# Patient Record
Sex: FEMALE | Race: WHITE | NOT HISPANIC OR LATINO | Employment: FULL TIME | URBAN - METROPOLITAN AREA
[De-identification: names, ages, dates, MRNs, and addresses within clinical notes are randomized per-mention and may not be internally consistent; named-entity substitution may affect disease eponyms.]

---

## 2018-07-16 ENCOUNTER — OFFICE VISIT CONVERTED (OUTPATIENT)
Dept: ORTHOPEDIC SURGERY | Facility: CLINIC | Age: 58
End: 2018-07-16
Attending: ORTHOPAEDIC SURGERY

## 2018-08-06 ENCOUNTER — CONVERSION ENCOUNTER (OUTPATIENT)
Dept: ORTHOPEDIC SURGERY | Facility: CLINIC | Age: 58
End: 2018-08-06

## 2018-08-06 ENCOUNTER — OFFICE VISIT CONVERTED (OUTPATIENT)
Dept: ORTHOPEDIC SURGERY | Facility: CLINIC | Age: 58
End: 2018-08-06
Attending: ORTHOPAEDIC SURGERY

## 2018-09-10 ENCOUNTER — CONVERSION ENCOUNTER (OUTPATIENT)
Dept: ORTHOPEDIC SURGERY | Facility: CLINIC | Age: 58
End: 2018-09-10

## 2018-09-10 ENCOUNTER — OFFICE VISIT CONVERTED (OUTPATIENT)
Dept: ORTHOPEDIC SURGERY | Facility: CLINIC | Age: 58
End: 2018-09-10
Attending: ORTHOPAEDIC SURGERY

## 2018-09-20 ENCOUNTER — OFFICE VISIT CONVERTED (OUTPATIENT)
Dept: ORTHOPEDIC SURGERY | Facility: CLINIC | Age: 58
End: 2018-09-20
Attending: ORTHOPAEDIC SURGERY

## 2018-10-18 ENCOUNTER — OFFICE VISIT CONVERTED (OUTPATIENT)
Dept: ORTHOPEDIC SURGERY | Facility: CLINIC | Age: 58
End: 2018-10-18
Attending: ORTHOPAEDIC SURGERY

## 2018-11-17 ENCOUNTER — CONVERSION ENCOUNTER (OUTPATIENT)
Dept: MAMMOGRAPHY | Facility: HOSPITAL | Age: 58
End: 2018-11-17

## 2018-11-29 ENCOUNTER — OFFICE VISIT CONVERTED (OUTPATIENT)
Dept: ORTHOPEDIC SURGERY | Facility: CLINIC | Age: 58
End: 2018-11-29
Attending: ORTHOPAEDIC SURGERY

## 2019-04-19 ENCOUNTER — OFFICE VISIT CONVERTED (OUTPATIENT)
Dept: ORTHOPEDIC SURGERY | Facility: CLINIC | Age: 59
End: 2019-04-19
Attending: ORTHOPAEDIC SURGERY

## 2019-04-19 ENCOUNTER — CONVERSION ENCOUNTER (OUTPATIENT)
Dept: ORTHOPEDIC SURGERY | Facility: CLINIC | Age: 59
End: 2019-04-19

## 2019-06-12 ENCOUNTER — HOSPITAL ENCOUNTER (OUTPATIENT)
Dept: MAMMOGRAPHY | Facility: HOSPITAL | Age: 59
Discharge: HOME | End: 2019-06-12
Attending: PHYSICIAN ASSISTANT

## 2019-12-19 ENCOUNTER — HOSPITAL ENCOUNTER (OUTPATIENT)
Dept: LAB | Facility: HOSPITAL | Age: 59
Discharge: HOME | End: 2019-12-19
Attending: PHYSICIAN ASSISTANT

## 2019-12-19 LAB
25(OH)D3 SERPL-MCNC: 21.5 NG/ML (ref 30–100)
ALBUMIN SERPL-MCNC: 4.1 G/DL (ref 3.5–5)
ALBUMIN/GLOB SERPL: 1.3 {RATIO} (ref 1.4–2.6)
ALP SERPL-CCNC: 65 U/L (ref 53–141)
ALT SERPL-CCNC: 42 U/L (ref 10–40)
ANION GAP SERPL CALC-SCNC: 16 MMOL/L (ref 8–19)
AST SERPL-CCNC: 28 U/L (ref 15–50)
BASOPHILS # BLD AUTO: 0.06 10*3/UL (ref 0–0.2)
BASOPHILS NFR BLD AUTO: 0.9 % (ref 0–3)
BILIRUB SERPL-MCNC: 0.49 MG/DL (ref 0.2–1.3)
BUN SERPL-MCNC: 19 MG/DL (ref 5–25)
BUN/CREAT SERPL: 26 {RATIO} (ref 6–20)
CALCIUM SERPL-MCNC: 8.7 MG/DL (ref 8.7–10.4)
CHLORIDE SERPL-SCNC: 103 MMOL/L (ref 99–111)
CHOLEST SERPL-MCNC: 184 MG/DL (ref 107–200)
CHOLEST/HDLC SERPL: 4.2 {RATIO} (ref 3–6)
CONV ABS IMM GRAN: 0.02 10*3/UL (ref 0–0.2)
CONV CO2: 27 MMOL/L (ref 22–32)
CONV IMMATURE GRAN: 0.3 % (ref 0–1.8)
CONV TOTAL PROTEIN: 7.2 G/DL (ref 6.3–8.2)
CREAT UR-MCNC: 0.74 MG/DL (ref 0.5–0.9)
DEPRECATED RDW RBC AUTO: 41.7 FL (ref 36.4–46.3)
EOSINOPHIL # BLD AUTO: 0.24 10*3/UL (ref 0–0.7)
EOSINOPHIL # BLD AUTO: 3.6 % (ref 0–7)
ERYTHROCYTE [DISTWIDTH] IN BLOOD BY AUTOMATED COUNT: 12.8 % (ref 11.7–14.4)
GFR SERPLBLD BASED ON 1.73 SQ M-ARVRAT: >60 ML/MIN/{1.73_M2}
GLOBULIN UR ELPH-MCNC: 3.1 G/DL (ref 2–3.5)
GLUCOSE SERPL-MCNC: 95 MG/DL (ref 65–99)
HCT VFR BLD AUTO: 41 % (ref 37–47)
HDLC SERPL-MCNC: 44 MG/DL (ref 40–60)
HGB BLD-MCNC: 13.6 G/DL (ref 12–16)
LDLC SERPL CALC-MCNC: 110 MG/DL (ref 70–100)
LYMPHOCYTES # BLD AUTO: 2.22 10*3/UL (ref 1–5)
LYMPHOCYTES NFR BLD AUTO: 33.5 % (ref 20–45)
MCH RBC QN AUTO: 29.6 PG (ref 27–31)
MCHC RBC AUTO-ENTMCNC: 33.2 G/DL (ref 33–37)
MCV RBC AUTO: 89.1 FL (ref 81–99)
MONOCYTES # BLD AUTO: 0.56 10*3/UL (ref 0.2–1.2)
MONOCYTES NFR BLD AUTO: 8.5 % (ref 3–10)
NEUTROPHILS # BLD AUTO: 3.52 10*3/UL (ref 2–8)
NEUTROPHILS NFR BLD AUTO: 53.2 % (ref 30–85)
NRBC CBCN: 0 % (ref 0–0.7)
OSMOLALITY SERPL CALC.SUM OF ELEC: 296 MOSM/KG (ref 273–304)
PLATELET # BLD AUTO: 284 10*3/UL (ref 130–400)
PMV BLD AUTO: 9.2 FL (ref 9.4–12.3)
POTASSIUM SERPL-SCNC: 3.9 MMOL/L (ref 3.5–5.3)
RBC # BLD AUTO: 4.6 10*6/UL (ref 4.2–5.4)
SODIUM SERPL-SCNC: 142 MMOL/L (ref 135–147)
T4 FREE SERPL-MCNC: 1.4 NG/DL (ref 0.9–1.8)
TRIGL SERPL-MCNC: 148 MG/DL (ref 40–150)
TSH SERPL-ACNC: 2.31 M[IU]/L (ref 0.27–4.2)
VLDLC SERPL-MCNC: 30 MG/DL (ref 5–37)
WBC # BLD AUTO: 6.62 10*3/UL (ref 4.8–10.8)

## 2020-06-23 ENCOUNTER — HOSPITAL ENCOUNTER (OUTPATIENT)
Dept: LAB | Facility: HOSPITAL | Age: 60
Discharge: HOME | End: 2020-06-23
Attending: PHYSICIAN ASSISTANT

## 2020-06-23 LAB
25(OH)D3 SERPL-MCNC: 30.1 NG/ML (ref 30–100)
ALBUMIN SERPL-MCNC: 4.3 G/DL (ref 3.5–5)
ALBUMIN/GLOB SERPL: 1.4 {RATIO} (ref 1.4–2.6)
ALP SERPL-CCNC: 63 U/L (ref 53–141)
ALT SERPL-CCNC: 30 U/L (ref 10–40)
ANION GAP SERPL CALC-SCNC: 17 MMOL/L (ref 8–19)
AST SERPL-CCNC: 23 U/L (ref 15–50)
BILIRUB SERPL-MCNC: 0.57 MG/DL (ref 0.2–1.3)
BUN SERPL-MCNC: 17 MG/DL (ref 5–25)
BUN/CREAT SERPL: 24 {RATIO} (ref 6–20)
CALCIUM SERPL-MCNC: 8.8 MG/DL (ref 8.7–10.4)
CHLORIDE SERPL-SCNC: 103 MMOL/L (ref 99–111)
CHOLEST SERPL-MCNC: 187 MG/DL (ref 107–200)
CHOLEST/HDLC SERPL: 4.3 {RATIO} (ref 3–6)
CONV CO2: 25 MMOL/L (ref 22–32)
CONV TOTAL PROTEIN: 7.3 G/DL (ref 6.3–8.2)
CREAT UR-MCNC: 0.72 MG/DL (ref 0.5–0.9)
GFR SERPLBLD BASED ON 1.73 SQ M-ARVRAT: >60 ML/MIN/{1.73_M2}
GLOBULIN UR ELPH-MCNC: 3 G/DL (ref 2–3.5)
GLUCOSE SERPL-MCNC: 88 MG/DL (ref 65–99)
HDLC SERPL-MCNC: 43 MG/DL (ref 40–60)
LDLC SERPL CALC-MCNC: 115 MG/DL (ref 70–100)
OSMOLALITY SERPL CALC.SUM OF ELEC: 293 MOSM/KG (ref 273–304)
POTASSIUM SERPL-SCNC: 4.2 MMOL/L (ref 3.5–5.3)
SODIUM SERPL-SCNC: 141 MMOL/L (ref 135–147)
T4 FREE SERPL-MCNC: 1.4 NG/DL (ref 0.9–1.8)
TRIGL SERPL-MCNC: 146 MG/DL (ref 40–150)
TSH SERPL-ACNC: 2.07 M[IU]/L (ref 0.27–4.2)
VLDLC SERPL-MCNC: 29 MG/DL (ref 5–37)

## 2020-09-24 ENCOUNTER — OFFICE VISIT CONVERTED (OUTPATIENT)
Dept: ORTHOPEDIC SURGERY | Facility: CLINIC | Age: 60
End: 2020-09-24
Attending: ORTHOPAEDIC SURGERY

## 2020-10-13 ENCOUNTER — OFFICE VISIT CONVERTED (OUTPATIENT)
Dept: ORTHOPEDIC SURGERY | Facility: CLINIC | Age: 60
End: 2020-10-13
Attending: ORTHOPAEDIC SURGERY

## 2020-10-28 ENCOUNTER — HOSPITAL ENCOUNTER (OUTPATIENT)
Dept: PREADMISSION TESTING | Facility: HOSPITAL | Age: 60
Discharge: HOME | End: 2020-10-28
Attending: ORTHOPAEDIC SURGERY

## 2020-10-30 LAB — SARS-COV-2 RNA SPEC QL NAA+PROBE: NOT DETECTED

## 2020-11-02 ENCOUNTER — HOSPITAL ENCOUNTER (OUTPATIENT)
Dept: PERIOP | Facility: HOSPITAL | Age: 60
Setting detail: HOSPITAL OUTPATIENT SURGERY
Discharge: HOME | End: 2020-11-02
Attending: ORTHOPAEDIC SURGERY

## 2020-11-12 ENCOUNTER — HOSPITAL ENCOUNTER (OUTPATIENT)
Dept: CARDIOLOGY | Facility: HOSPITAL | Age: 60
Discharge: HOME | End: 2020-11-12
Attending: ORTHOPAEDIC SURGERY

## 2020-11-17 ENCOUNTER — OFFICE VISIT CONVERTED (OUTPATIENT)
Dept: ORTHOPEDIC SURGERY | Facility: CLINIC | Age: 60
End: 2020-11-17
Attending: ORTHOPAEDIC SURGERY

## 2020-12-17 ENCOUNTER — OFFICE VISIT CONVERTED (OUTPATIENT)
Dept: ORTHOPEDIC SURGERY | Facility: CLINIC | Age: 60
End: 2020-12-17
Attending: PHYSICIAN ASSISTANT

## 2021-03-22 ENCOUNTER — HOSPITAL ENCOUNTER (OUTPATIENT)
Dept: LAB | Facility: HOSPITAL | Age: 61
Discharge: HOME | End: 2021-03-22
Attending: PHYSICIAN ASSISTANT

## 2021-03-22 LAB
25(OH)D3 SERPL-MCNC: 26.8 NG/ML (ref 30–100)
ALBUMIN SERPL-MCNC: 4.1 G/DL (ref 3.5–5)
ALBUMIN/GLOB SERPL: 1.3 {RATIO} (ref 1.4–2.6)
ALP SERPL-CCNC: 67 U/L (ref 43–160)
ALT SERPL-CCNC: 109 U/L (ref 10–40)
ANION GAP SERPL CALC-SCNC: 17 MMOL/L (ref 8–19)
AST SERPL-CCNC: 59 U/L (ref 15–50)
BASOPHILS # BLD AUTO: 0.07 10*3/UL (ref 0–0.2)
BASOPHILS NFR BLD AUTO: 1.1 % (ref 0–3)
BILIRUB SERPL-MCNC: 0.5 MG/DL (ref 0.2–1.3)
BNP SERPL-MCNC: 106 PG/ML (ref 0–900)
BUN SERPL-MCNC: 27 MG/DL (ref 5–25)
BUN/CREAT SERPL: 33 {RATIO} (ref 6–20)
CALCIUM SERPL-MCNC: 9.2 MG/DL (ref 8.7–10.4)
CHLORIDE SERPL-SCNC: 102 MMOL/L (ref 99–111)
CONV ABS IMM GRAN: 0.02 10*3/UL (ref 0–0.2)
CONV CO2: 27 MMOL/L (ref 22–32)
CONV IMMATURE GRAN: 0.3 % (ref 0–1.8)
CONV TOTAL PROTEIN: 7.2 G/DL (ref 6.3–8.2)
CREAT UR-MCNC: 0.83 MG/DL (ref 0.5–0.9)
DEPRECATED RDW RBC AUTO: 46.2 FL (ref 36.4–46.3)
EOSINOPHIL # BLD AUTO: 0.36 10*3/UL (ref 0–0.7)
EOSINOPHIL # BLD AUTO: 5.5 % (ref 0–7)
ERYTHROCYTE [DISTWIDTH] IN BLOOD BY AUTOMATED COUNT: 13.7 % (ref 11.7–14.4)
GFR SERPLBLD BASED ON 1.73 SQ M-ARVRAT: >60 ML/MIN/{1.73_M2}
GLOBULIN UR ELPH-MCNC: 3.1 G/DL (ref 2–3.5)
GLUCOSE SERPL-MCNC: 87 MG/DL (ref 65–99)
HCT VFR BLD AUTO: 42.8 % (ref 37–47)
HGB BLD-MCNC: 14.1 G/DL (ref 12–16)
LYMPHOCYTES # BLD AUTO: 2.73 10*3/UL (ref 1–5)
LYMPHOCYTES NFR BLD AUTO: 41.5 % (ref 20–45)
MCH RBC QN AUTO: 30.2 PG (ref 27–31)
MCHC RBC AUTO-ENTMCNC: 32.9 G/DL (ref 33–37)
MCV RBC AUTO: 91.6 FL (ref 81–99)
MONOCYTES # BLD AUTO: 0.61 10*3/UL (ref 0.2–1.2)
MONOCYTES NFR BLD AUTO: 9.3 % (ref 3–10)
NEUTROPHILS # BLD AUTO: 2.79 10*3/UL (ref 2–8)
NEUTROPHILS NFR BLD AUTO: 42.3 % (ref 30–85)
NRBC CBCN: 0 % (ref 0–0.7)
OSMOLALITY SERPL CALC.SUM OF ELEC: 298 MOSM/KG (ref 273–304)
PLATELET # BLD AUTO: 293 10*3/UL (ref 130–400)
PMV BLD AUTO: 10 FL (ref 9.4–12.3)
POTASSIUM SERPL-SCNC: 4.2 MMOL/L (ref 3.5–5.3)
RBC # BLD AUTO: 4.67 10*6/UL (ref 4.2–5.4)
SODIUM SERPL-SCNC: 142 MMOL/L (ref 135–147)
T4 FREE SERPL-MCNC: 1.4 NG/DL (ref 0.9–1.8)
TSH SERPL-ACNC: 2.19 M[IU]/L (ref 0.27–4.2)
WBC # BLD AUTO: 6.58 10*3/UL (ref 4.8–10.8)

## 2021-03-23 ENCOUNTER — HOSPITAL ENCOUNTER (OUTPATIENT)
Dept: GENERAL RADIOLOGY | Facility: HOSPITAL | Age: 61
Discharge: HOME | End: 2021-03-23
Attending: PHYSICIAN ASSISTANT

## 2021-04-05 ENCOUNTER — HOSPITAL ENCOUNTER (OUTPATIENT)
Dept: LAB | Facility: HOSPITAL | Age: 61
Discharge: HOME | End: 2021-04-05
Attending: INTERNAL MEDICINE

## 2021-04-05 LAB
ALBUMIN SERPL-MCNC: 4 G/DL (ref 3.5–5)
ALBUMIN/GLOB SERPL: 1.3 {RATIO} (ref 1.4–2.6)
ALP SERPL-CCNC: 57 U/L (ref 43–160)
ALT SERPL-CCNC: 134 U/L (ref 10–40)
ANION GAP SERPL CALC-SCNC: 14 MMOL/L (ref 8–19)
AST SERPL-CCNC: 70 U/L (ref 15–50)
BILIRUB SERPL-MCNC: 0.52 MG/DL (ref 0.2–1.3)
BUN SERPL-MCNC: 27 MG/DL (ref 5–25)
BUN/CREAT SERPL: 29 {RATIO} (ref 6–20)
CALCIUM SERPL-MCNC: 8.8 MG/DL (ref 8.7–10.4)
CHLORIDE SERPL-SCNC: 102 MMOL/L (ref 99–111)
CONV CO2: 29 MMOL/L (ref 22–32)
CONV TOTAL PROTEIN: 7 G/DL (ref 6.3–8.2)
CREAT UR-MCNC: 0.94 MG/DL (ref 0.5–0.9)
GFR SERPLBLD BASED ON 1.73 SQ M-ARVRAT: >60 ML/MIN/{1.73_M2}
GLOBULIN UR ELPH-MCNC: 3 G/DL (ref 2–3.5)
GLUCOSE SERPL-MCNC: 92 MG/DL (ref 65–99)
OSMOLALITY SERPL CALC.SUM OF ELEC: 297 MOSM/KG (ref 273–304)
POTASSIUM SERPL-SCNC: 4 MMOL/L (ref 3.5–5.3)
SODIUM SERPL-SCNC: 141 MMOL/L (ref 135–147)

## 2021-04-12 ENCOUNTER — HOSPITAL ENCOUNTER (OUTPATIENT)
Dept: OTHER | Facility: HOSPITAL | Age: 61
Discharge: HOME | End: 2021-04-12
Attending: PHYSICIAN ASSISTANT

## 2021-04-12 LAB
D DIMER PPP FEU-MCNC: 0.36 MG/L (ref 0–0.59)
IRON SERPL-MCNC: 101 UG/DL (ref 60–170)

## 2021-04-13 LAB
CONV HEPATITIS B SURFACE AG W CONFIRMATION RE: NEGATIVE
HAV IGM SERPL QL IA: NEGATIVE
HBV CORE IGM SERPL QL IA: NEGATIVE
HCV AB SER DONR QL: <0.1 S/CO RATIO (ref 0–0.9)

## 2021-04-14 LAB — SMOOTH MUSCLE F-ACTIN AB IGG: 8 UNITS (ref 0–19)

## 2021-04-21 ENCOUNTER — HOSPITAL ENCOUNTER (OUTPATIENT)
Dept: CARDIOLOGY | Facility: HOSPITAL | Age: 61
Discharge: HOME | End: 2021-04-21
Attending: PHYSICIAN ASSISTANT

## 2021-05-07 ENCOUNTER — HOSPITAL ENCOUNTER (OUTPATIENT)
Dept: NUCLEAR MEDICINE | Facility: HOSPITAL | Age: 61
Discharge: HOME | End: 2021-05-07
Attending: PHYSICIAN ASSISTANT

## 2021-05-10 NOTE — H&P
History and Physical      Patient Name: Keke Ureña   Patient ID: [id number]   Sex: Female   YOB: 1960    Referring Provider: Gab Reyes MD    Visit Date: September 24, 2020    Provider: Charles Johnson MD   Location: [facility]   Location Address: [address] [place] [address]  [id number]   Location Phone: [phone]          Chief Complaint  · Left knee pain       History Of Present Illness  Keke Ureña is a 59 year old /White female who presents today to [facility].      The patient presents here today for evaluation of left knee pain.  She previously had a right Total Knee Arthroplasty September 2018. 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 She started having pain while doing this. She states she has a lot of pain at night.  The patient has been wearing a brace that she says seems to help with her pain.             Past Medical History  Abdominal Pain, RUQ; Aftercare;following joint replacement, right knee; Bladder Disorder; Choledocholithiasis; High blood pressure; Hypertension; Kidney Disease; Kidney stones; Medial meniscus tear, right; Postoperative Exam Following Surgery; Primary osteoarthritis of right knee; Right knee pain         Past Surgical History  Breast; Colonoscopy; Gallbladder; Hysterectomy; Hysterectomy-Abdominal; Parathyroidectomy; Rectal surgery         Medication List  lisinopril oral         Allergy List  NO KNOWN DRUG ALLERGIES       Allergies Reconciled  Family Medical History  Heart Disease; Cancer, Unspecified; Renal Cancer         Social History  Alcohol (Never); Alcohol Use (Never); Caffeine (Current some day); lives with spouse; .; Recreational Drug Use (Never); Second hand smoke exposure (Never); Tobacco (Never); Working         Review of Systems  · Constitutional  o Denies  o : fever, chills, weight loss  · Cardiovascular  o Denies  o : chest pain, shortness of  "breath  · Gastrointestinal  o Denies  o : liver disease, heartburn, nausea, blood in stools  · Genitourinary  o Denies  o : painful urination, blood in urine  · Integument  o Denies  o : rash, itching  · Neurologic  o Denies  o : headache, weakness, loss of consciousness  · Musculoskeletal  o Denies  o : painful, swollen joints  · Psychiatric  o Denies  o : drug/alcohol addiction, anxiety, depression      Vitals  Date Time BP Position Site L\R Cuff Size HR RR TEMP (F) WT  HT  BMI kg/m2 BSA m2 O2 Sat        09/24/2020 09:29 AM      75 - R   255lbs 8oz 5'  9\" 37.73 2.38 98 %          Physical Examination  · Constitutional  o Appearance  o : well developed, well-nourished, no obvious deformities present  · Head and Face  o Head  o :   § Inspection  § : normocephalic  o Face  o :   § Inspection  § : no facial lesions  · Eyes  o Conjunctivae  o : conjunctivae normal  o Sclerae  o : sclerae white  · Ears, Nose, Mouth and Throat  o Ears  o :   § External Ears  § : appearance within normal limits  § Hearing  § : intact  o Nose  o :   § External Nose  § : appearance normal  · Neck  o Inspection/Palpation  o : normal appearance  o Range of Motion  o : full range of motion  · Respiratory  o Respiratory Effort  o : breathing unlabored  o Inspection of Chest  o : normal appearance  o Auscultation of Lungs  o : no audible wheezing or rales  · Cardiovascular  o Heart  o : regular rate  · Gastrointestinal  o Abdominal Examination  o : soft and non-tender  · Skin and Subcutaneous Tissue  o General Inspection  o : intact, no rashes  · Psychiatric  o General  o : Alert and oriented x3  o Judgement and Insight  o : judgment and insight intact  o Mood and Affect  o : mood normal, affect appropriate  · Left Knee  o Inspection  o : 1+ edema to the left lower extremity. Mild swelling to the foot and ankle. 0 Extension; 130 Flexion. No swelling to knee. Pain with Jayden. Stable to varus and valgus stress. No significant joint line " tenderness. Neurovascularly intact.  · In Office Procedures  o View  o : LAT/SUNRISE/STANDING  o Site  o : left, knee  o Indication  o : Left knee pain  o Study  o : X-rays ordered, taken in the office, and reviewed today.  o Xray  o : no fracture; no significant joint effusion; tricarpmental degenerative changes with joint space narrowing and osteophytes.   o Comparative Data  o : No comparative data found          Assessment  · Primary osteoarthritis of left knee     715.16/M17.12  · Left knee pain, unspecified chronicity     719.46/M25.562  · Left knee sprain     844.9/S83.92XA      Plan  · Orders  o Knee (Left) [facility] ([id number]) - 719.46/M25.562 - 09/24/2020  · Medications  o Medications have been Reconciled  o Transition of Care or Provider Policy  · Instructions  o Reviewed the patient's Past Medical, Social, and Family history as well as the ROS at today's visit, no changes.  o Call or return if worsening symptoms.  o X-ray ordered, taken and reviewed at this visit.  o Follow Up in 4 weeks.   o The above service was scribed by Molly Reyes on my behalf and I attest to the accuracy of the note. jsb  o Discussed treatment options with the patient involving conservative treatment involving steroid injections, physical therapy, home exercises, bracing, and anti-inflammatories. Patient declined a steroid injection today. Plan to continue knee brace. Prescription for diclofenac 75mg BID given today. Follow up in 4 weeks to recheck. May call to schedule for an MRI if symptoms persist.   o Electronically Identified Patient Education Materials Provided Electronically            Electronically Signed by: Molly Reyes MA -Author on September 24, 2020 09:53:49 AM  Electronically Co-signed by: Charles Johnson MD -Reviewer on September 24, 2020 08:16:04 PM

## 2021-05-13 NOTE — PROGRESS NOTES
Progress Note      Patient Name: Keke Ureña   Patient ID: [id number]   Sex: Female   YOB: 1960    Referring Provider: Gab Reyes MD    Visit Date: October 13, 2020    Provider: Charles Johnson MD   Location: [facility]   Location Address: [address] [place] [address]  [id number]   Location Phone: [phone]          Chief Complaint  · left knee pain      History Of Present Illness  Keke Ureña is a 59 year old /White female who presents today to [facility].      The patient presents here today for follow up evaluation of left knee. The patient was in [place] a week ago when she experienced a pop on Thursday causing her so much pain she couldn't bear weight. She is walking on it today but still does have pain. The patient has been taking diclofenac and using a brace with minimal relief.          Past Medical History  Abdominal Pain, RUQ; Aftercare;following joint replacement, right knee; Bladder Disorder; Choledocholithiasis; High blood pressure; Hypertension; Kidney Disease; Kidney stones; Medial meniscus tear, right; Postoperative Exam Following Surgery; Primary osteoarthritis of right knee; Right knee pain; Thyroid disorder         Past Surgical History  Artificial Joints/Limbs; Breast; Colonoscopy; Gallbladder; Hysterectomy; Hysterectomy-Abdominal; Joint Surgery; Parathyroidectomy; Rectal surgery         Medication List  diclofenac sodium 75 mg oral tablet,delayed release (DR/EC); lisinopril oral; Tenoretic 50 50-25 mg oral tablet         Allergy List  NO KNOWN DRUG ALLERGIES       Allergies Reconciled  Family Medical History  Heart Disease; Cancer, Unspecified; Renal Cancer         Social History  Alcohol (Never); Alcohol Use (Never); Caffeine (Current some day); lives with spouse; .; Recreational Drug Use (Never); Second hand smoke exposure (Never); Tobacco (Never); Working         Review of  "Systems  · Constitutional  o Denies  o : fever, chills, weight loss  · Cardiovascular  o Denies  o : chest pain, shortness of breath  · Gastrointestinal  o Denies  o : liver disease, heartburn, nausea, blood in stools  · Genitourinary  o Denies  o : painful urination, blood in urine  · Integument  o Denies  o : rash, itching  · Neurologic  o Denies  o : headache, weakness, loss of consciousness  · Musculoskeletal  o Denies  o : painful, swollen joints  · Psychiatric  o Denies  o : drug/alcohol addiction, anxiety, depression      Vitals  Date Time BP Position Site L\R Cuff Size HR RR TEMP (F) WT  HT  BMI kg/m2 BSA m2 O2 Sat FR L/min FiO2        10/13/2020 01:22 PM      74 - R   258lbs 0oz 5'  9\" 38.1 2.39 98 %            Physical Examination  · Constitutional  o Appearance  o : well developed, well-nourished, no obvious deformities present  · Head and Face  o Head  o :   § Inspection  § : normocephalic  o Face  o :   § Inspection  § : no facial lesions  · Eyes  o Conjunctivae  o : conjunctivae normal  o Sclerae  o : sclerae white  · Ears, Nose, Mouth and Throat  o Ears  o :   § External Ears  § : appearance within normal limits  § Hearing  § : intact  o Nose  o :   § External Nose  § : appearance normal  · Neck  o Inspection/Palpation  o : normal appearance  o Range of Motion  o : full range of motion  · Respiratory  o Respiratory Effort  o : breathing unlabored  o Inspection of Chest  o : normal appearance  o Auscultation of Lungs  o : no audible wheezing or rales  · Cardiovascular  o Heart  o : regular rate  · Gastrointestinal  o Abdominal Examination  o : soft and non-tender  · Skin and Subcutaneous Tissue  o General Inspection  o : intact, no rashes  · Psychiatric  o General  o : Alert and oriented x3  o Judgement and Insight  o : judgment and insight intact  o Mood and Affect  o : mood normal, affect appropriate  · Left Knee  o Inspection  o : 1+ edema to the left lower extremity. Mild swelling to the foot and " ankle. 0 Extension; 130 Flexion. No swelling to knee. Pain with Jayden. Stable to varus and valgus stress. No significant joint line tenderness. Neurovascularly intact.   · Imaging  o Imaging  o : [facility] 09/2020: Mild-to-moderate tricompartment osteoarthritis with medial and patellofemoral compartment predominance with multifocal chondromalacia as detailed above but no definitive areas of high-grade chondromalacia. Small knee effusion with mild synovitis and at least 2 suspected loose bodies likely encapsulated along the posterior aspect of the knee. Myxoid degeneration medial meniscus without tear. Subtle truncation of the inner third mid body segment lateral meniscus may represent a small free margin radial tear.          Assessment  · Left knee pain, unspecified chronicity     719.46/M25.562  · Medial meniscus tear     836.0/S83.249A  · Lateral meniscal tear     836.1/S83.289A      Plan  · Medications  o Medications have been Reconciled  o Transition of Care or Provider Policy  · Instructions  o Reviewed the patient's Past Medical, Social, and Family history as well as the ROS at today's visit, no changes.  o Call or return if worsening symptoms.  o Discussed surgery.  o Risks/benefits discussed with patient including, but not limited to: infection, bleeding, neurovascular damage, malunion, nonunion, aesthetic deformity, need for further surgery, and death.  o Discussed with patient the implant type being used during surgery and patient understands and desires to proceed.  o Surgery pamphlet given.  o Discussed treatment options with the patient with operative treatment. I recommended a left knee arthroscopic partial medial and lateral meniscectomy and chondroplasty. Discussed the risks and benefits of a left knee arthroscopy. Patient expressed understanding and wished to proceed. Follow up 2 weeks post-operatively.   o Electronically Identified Patient Education Materials Provided  Electronically            Electronically Signed by: Molly Reyes MA -Author on October 13, 2020 01:59:23 PM  Electronically Co-signed by: Charles Johnson MD -Reviewer on October 13, 2020 09:44:38 PM

## 2021-05-13 NOTE — PROGRESS NOTES
Progress Note      Patient Name: Keke Ureña   Patient ID: [id number]   Sex: Female   YOB: 1960    Referring Provider: Gab Reyes MD    Visit Date: November 17, 2020    Provider: Charles Johnson MD   Location: [facility]   Location Address: [address] [place] [address]  [id number]   Location Phone: [phone]          Chief Complaint  · Followup left knee arthroscopy 11/02/2020.       History Of Present Illness  Keke Ureña is a 60 year old /White female who presents today for followup of the left knee. She is doing pretty well today. She did have some swelling to the knee and was sent for an ultrasound of a DVT, which was negative. She is doing some better today. She still just has some pain in the medial knee and medial proximal tibial region. No new injury. She was initially doing better with therapy, but feels like the pain is increased some recently.       Past Medical History  Abdominal Pain, RUQ; Aftercare;following joint replacement, right knee; Bladder Disorder; Choledocholithiasis; High blood pressure; Hypertension; Kidney Disease; Kidney stones; Medial meniscus tear, right; Postoperative Exam Following Surgery; Primary osteoarthritis of right knee; Right knee pain; Thyroid disorder         Past Surgical History  Artificial Joints/Limbs; Breast; Colonoscopy; Gallbladder; Hysterectomy; Hysterectomy-Abdominal; Joint Surgery; Parathyroidectomy; Rectal surgery         Medication List  diclofenac sodium 75 mg oral tablet,delayed release (DR/EC); lisinopril oral; Tenoretic 50 50-25 mg oral tablet         Allergy List  NO KNOWN DRUG ALLERGIES         Family Medical History  Heart Disease; Cancer, Unspecified; Renal Cancer         Social History  Alcohol (Never); Alcohol Use (Never); Caffeine (Current some day); lives with spouse; .; Recreational Drug Use (Never); Second hand smoke exposure (Never); Tobacco (Never); Working         Review of  "Systems  · Constitutional  o Denies  o : fever, chills, weight loss  · Cardiovascular  o Denies  o : chest pain, shortness of breath  · Gastrointestinal  o Denies  o : liver disease, heartburn, nausea, blood in stools  · Genitourinary  o Denies  o : painful urination, blood in urine  · Integument  o Denies  o : rash, itching  · Neurologic  o Denies  o : headache, weakness, loss of consciousness  · Musculoskeletal  o Denies  o : painful, swollen joints  · Psychiatric  o Denies  o : drug/alcohol addiction, anxiety, depression      Vitals  Date Time BP Position Site L\R Cuff Size HR RR TEMP (F) WT  HT  BMI kg/m2 BSA m2 O2 Sat FR L/min FiO2 HC       11/17/2020 09:13 AM      80 - R   255lbs 0oz 5'  9\" 37.66 2.37             Physical Examination  · Constitutional  o Appearance  o : well developed, well-nourished, no obvious deformities present  · Head and Face  o Head  o :   § Inspection  § : normocephalic  o Face  o :   § Inspection  § : no facial lesions  · Eyes  o Conjunctivae  o : conjunctivae normal  o Sclerae  o : sclerae white  · Ears, Nose, Mouth and Throat  o Ears  o :   § External Ears  § : appearance within normal limits  § Hearing  § : intact  o Nose  o :   § External Nose  § : appearance normal  · Neck  o Inspection/Palpation  o : normal appearance  o Range of Motion  o : full range of motion  · Respiratory  o Respiratory Effort  o : breathing unlabored  o Inspection of Chest  o : normal appearance  o Auscultation of Lungs  o : no audible wheezing or rales  · Cardiovascular  o Heart  o : regular rate  · Gastrointestinal  o Abdominal Examination  o : soft and non-tender  · Skin and Subcutaneous Tissue  o General Inspection  o : intact, no rashes  · Psychiatric  o General  o : Alert and oriented x3  o Judgement and Insight  o : judgment and insight intact  o Mood and Affect  o : mood normal, affect appropriate  · Left Knee  o Inspection  o : Incisions are well healing. Mild swelling to the anterior knee. " Negative Homans'. Calf soft and nontender. Neurovascularly intact. Range of motion -4 to 115. Stability intact. Ambulates with mild antalgic gait.          Assessment  · Aftercare following Left knee arthroscopic partial medial and lateral meniscectomy, chondroplasty, and removal of loose body 11/02/2020      V54.81  · Left knee pain, unspecified chronicity     719.46/M25.562      Plan  · Medications  o Medications have been Reconciled  o Transition of Care or Provider Policy  · Instructions  o Reviewed the patient's Past Medical, Social, and Family history as well as the ROS at today's visit, no changes.  o Call or return if worsening symptoms.  o Note transcribed by Loan Kwok. jsb   o Discussed treatment options with the patient. Continue physical therapy. Given off-work note for 2 weeks. Follow up in 4 weeks to recheck.             Electronically Signed by: Loan Kwok-, Other -Author on November 19, 2020 07:48:52 PM  Electronically Co-signed by: Charles Johnson MD -Reviewer on November 20, 2020 10:47:08 PM

## 2021-05-14 ENCOUNTER — HOSPITAL ENCOUNTER (OUTPATIENT)
Dept: NUCLEAR MEDICINE | Facility: HOSPITAL | Age: 61
Discharge: HOME | End: 2021-05-14
Attending: PHYSICIAN ASSISTANT

## 2021-05-14 VITALS — WEIGHT: 255.5 LBS | HEIGHT: 69 IN | OXYGEN SATURATION: 98 % | HEART RATE: 75 BPM | BODY MASS INDEX: 37.84 KG/M2

## 2021-05-14 VITALS — HEIGHT: 69 IN | HEART RATE: 64 BPM | WEIGHT: 261.5 LBS | OXYGEN SATURATION: 96 % | BODY MASS INDEX: 38.73 KG/M2

## 2021-05-14 VITALS — HEART RATE: 74 BPM | BODY MASS INDEX: 38.21 KG/M2 | OXYGEN SATURATION: 98 % | HEIGHT: 69 IN | WEIGHT: 258 LBS

## 2021-05-14 VITALS — WEIGHT: 255 LBS | HEART RATE: 80 BPM | BODY MASS INDEX: 37.77 KG/M2 | HEIGHT: 69 IN

## 2021-05-14 NOTE — PROGRESS NOTES
Progress Note      Patient Name: Keke Ureña   Patient ID: [id number]   Sex: Female   YOB: 1960    Referring Provider: Gab Reyes MD    Visit Date: December 17, 2020    Provider: Jefferson Osorio PA-C   Location: [facility]   Location Address: [address] [place] [address]  [id number]   Location Phone: [phone]          Chief Complaint  · Left knee pain      History Of Present Illness  Keke Ureña is a 60 year old /White female who presents today to [facility]. Patient presents for follow-up evaluation of left knee arthroscopic partial medial and partial lateral meniscectomy, chondroplasty, removal of loose body, 11/2/2020. Patient states she is doing well, she states she did physical therapy but it was not doing much to help her, she did not need it and she stopped. Patient is doing well, takes diclofenac as needed for pain. No new complaints today       Past Medical History  Abdominal Pain, RUQ; Aftercare;following joint replacement, right knee; Bladder Disorder; Choledocholithiasis; High blood pressure; Hypertension; Kidney Disease; Kidney stones; Medial meniscus tear, right; Postoperative Exam Following Surgery; Primary osteoarthritis of right knee; Right knee pain; Thyroid disorder         Past Surgical History  Artificial Joints/Limbs; Breast; Colonoscopy; Gallbladder; Hysterectomy; Hysterectomy-Abdominal; Joint Surgery; Parathyroidectomy; Rectal surgery         Medication List  diclofenac sodium 75 mg oral tablet,delayed release (DR/EC); lisinopril oral; Tenoretic 50 50-25 mg oral tablet         Allergy List  NO KNOWN DRUG ALLERGIES       Allergies Reconciled  Family Medical History  Heart Disease; Cancer, Unspecified; Renal Cancer         Social History  Alcohol (Never); Alcohol Use (Never); Caffeine (Current some day); lives with spouse; .; Recreational Drug Use (Never); Second hand smoke exposure (Never); Tobacco (Never);  "Working         Review of Systems  · Constitutional  o Denies  o : fever, chills, weight loss  · Cardiovascular  o Denies  o : chest pain, shortness of breath  · Gastrointestinal  o Denies  o : liver disease, heartburn, nausea, blood in stools  · Genitourinary  o Denies  o : painful urination, blood in urine  · Integument  o Denies  o : rash, itching  · Neurologic  o Denies  o : headache, weakness, loss of consciousness  · Musculoskeletal  o Denies  o : painful, swollen joints  · Psychiatric  o Denies  o : drug/alcohol addiction, anxiety, depression      Vitals  Date Time BP Position Site L\R Cuff Size HR RR TEMP (F) WT  HT  BMI kg/m2 BSA m2 O2 Sat FR L/min FiO2        12/17/2020 09:48 AM      64 - R   261lbs 8oz 5'  9\" 38.62 2.4 96 %            Physical Examination  · Constitutional  o Appearance  o : well developed, well-nourished, no obvious deformities present  · Head and Face  o Head  o :   § Inspection  § : normocephalic  o Face  o :   § Inspection  § : no facial lesions  · Eyes  o Conjunctivae  o : conjunctivae normal  o Sclerae  o : sclerae white  · Ears, Nose, Mouth and Throat  o Ears  o :   § External Ears  § : appearance within normal limits  § Hearing  § : intact  o Nose  o :   § External Nose  § : appearance normal  · Neck  o Inspection/Palpation  o : normal appearance  o Range of Motion  o : full range of motion  · Respiratory  o Respiratory Effort  o : breathing unlabored  o Inspection of Chest  o : normal appearance  o Auscultation of Lungs  o : no audible wheezing or rales  · Cardiovascular  o Heart  o : regular rate  · Gastrointestinal  o Abdominal Examination  o : soft and non-tender  · Skin and Subcutaneous Tissue  o General Inspection  o : intact, no rashes  · Psychiatric  o General  o : Alert and oriented x3  o Judgement and Insight  o : judgment and insight intact  o Mood and Affect  o : mood normal, affect appropriate  · Left Knee  o Inspection  o : Incisions are well-healed, no erythema, " no ecchymosis, no swelling, no effusion, no signs of infection. Full extension, flexion 120, stable anterior/posterior drawer, stable to varus/valgus stress. Nontender to palpation, no pain with range of motion.          Assessment  · Aftercare following surgery of left knee arthroscopic partial medial and partial lateral meniscectomy, chondroplasty, removal of loose body, 11/2/2020     V54.81  · Left knee pain, unspecified chronicity     719.46/M25.562      Plan  · Medications  o Medications have been Reconciled  o Transition of Care or Provider Policy  · Instructions  o Reviewed the patient's Past Medical, Social, and Family history as well as the ROS at today's visit, no changes.  o Call or return if worsening symptoms.  o Follow Up PRN.  o Patient was given refill of diclofenac, take as needed, continue activity and weightbearing as tolerated, follow-up as needed. Patient agrees.            Electronically Signed by: HAY Velasquez-LOLI -Author on December 17, 2020 10:36:44 AM  Electronically Co-signed by: Charles Johnson MD -Reviewer on December 17, 2020 10:49:25 PM

## 2021-05-15 VITALS — HEART RATE: 87 BPM | OXYGEN SATURATION: 95 % | HEIGHT: 69 IN

## 2021-05-16 VITALS — HEIGHT: 69 IN | BODY MASS INDEX: 35.25 KG/M2 | WEIGHT: 238 LBS | HEART RATE: 82 BPM | OXYGEN SATURATION: 94 %

## 2021-05-16 VITALS — OXYGEN SATURATION: 94 % | HEIGHT: 68 IN | HEART RATE: 82 BPM

## 2021-05-16 VITALS — WEIGHT: 250.5 LBS | HEIGHT: 69 IN | HEART RATE: 97 BPM | BODY MASS INDEX: 37.1 KG/M2 | OXYGEN SATURATION: 95 %

## 2021-05-16 VITALS — HEIGHT: 69 IN | WEIGHT: 249.5 LBS | BODY MASS INDEX: 36.95 KG/M2 | OXYGEN SATURATION: 97 % | HEART RATE: 93 BPM

## 2021-05-16 VITALS — HEIGHT: 69 IN | BODY MASS INDEX: 36.88 KG/M2 | OXYGEN SATURATION: 96 % | WEIGHT: 249 LBS | HEART RATE: 82 BPM

## 2021-05-16 VITALS — BODY MASS INDEX: 36.68 KG/M2 | HEIGHT: 68 IN | OXYGEN SATURATION: 96 % | WEIGHT: 242 LBS

## 2021-11-22 NOTE — PROGRESS NOTES
"Chief Complaint/ HPI:   F/u with liver concerns and joint pain  Worse off meds  Tried osteobioflex , no help          Objective   Vital Signs  Vitals:    11/23/21 1145   BP: 114/72   Pulse: 70   Temp: 97.1 °F (36.2 °C)   SpO2: 98%   Weight: 121 kg (266 lb 3.2 oz)   Height: 175.3 cm (69.02\")      Body mass index is 39.29 kg/m².  Review of Systems   Constitutional: Negative.    HENT: Negative.    Eyes: Negative.    Respiratory: Negative.    Cardiovascular: Negative.    Gastrointestinal: Negative.    Endocrine: Negative.    Genitourinary: Negative.    Musculoskeletal: Positive for arthralgias, back pain, joint swelling and myalgias.   Allergic/Immunologic: Negative.    Neurological: Negative.    Hematological: Negative.    Psychiatric/Behavioral: Negative.       Physical Exam  Constitutional:       General: She is not in acute distress.     Appearance: Normal appearance.   HENT:      Head: Normocephalic.      Mouth/Throat:      Mouth: Mucous membranes are moist.   Eyes:      Conjunctiva/sclera: Conjunctivae normal.      Pupils: Pupils are equal, round, and reactive to light.   Cardiovascular:      Rate and Rhythm: Normal rate and regular rhythm.      Pulses: Normal pulses.      Heart sounds: Normal heart sounds.   Pulmonary:      Effort: Pulmonary effort is normal.      Breath sounds: Normal breath sounds.   Abdominal:      General: Abdomen is flat. Bowel sounds are normal.      Palpations: Abdomen is soft.   Musculoskeletal:         General: No swelling. Normal range of motion.      Cervical back: Neck supple.   Skin:     General: Skin is warm and dry.      Coloration: Skin is not jaundiced.   Neurological:      General: No focal deficit present.      Mental Status: She is alert and oriented to person, place, and time. Mental status is at baseline.   Psychiatric:         Mood and Affect: Mood normal.         Behavior: Behavior normal.         Thought Content: Thought content normal.         Judgment: Judgment normal. "      obese, soft nt   Result Review :   Lab Results   Component Value Date     03/22/2021     CMP    CMP 3/22/21 4/5/21   Glucose 87 92   BUN 27 (A) 27 (A)   Creatinine 0.83 0.94 (A)   Sodium 142 141   Potassium 4.2 4.0   Chloride 102 102   Calcium 9.2 8.8   Albumin 4.1 4.0   Total Bilirubin 0.50 0.52   Alkaline Phosphatase 67 57   AST (SGOT) 59 (A) 70 (A)   ALT (SGPT) 109 (A) 134 (A)   (A) Abnormal value            CBC w/diff    CBC w/Diff 3/22/21   WBC 6.58   RBC 4.67   Hemoglobin 14.1   Hematocrit 42.8   MCV 91.6   MCH 30.2   MCHC 32.9 (A)   RDW 13.7   Platelets 293   Neutrophil Rel % 42.3   Lymphocyte Rel % 41.5   Monocyte Rel % 9.3   Eosinophil Rel % 5.5   Basophil Rel % 1.1   (A) Abnormal value                Lab Results   Component Value Date    TSH 2.190 03/22/2021    TSH 2.070 06/23/2020    TSH 2.310 12/19/2019      Lab Results   Component Value Date    FREET4 1.4 03/22/2021    FREET4 1.4 06/23/2020    FREET4 1.4 12/19/2019                          Assessment and Plan   Diagnoses and all orders for this visit:    1. Class 2 obesity due to excess calories without serious comorbidity with body mass index (BMI) of 37.0 to 37.9 in adult (Primary)  -     Comprehensive Metabolic Panel; Future  -     CBC & Differential; Future  -     Lipid Panel; Future  -     Vitamin D 25 Hydroxy; Future    2. Vitamin D deficiency  -     Comprehensive Metabolic Panel; Future  -     CBC & Differential; Future  -     Lipid Panel; Future  -     Vitamin D 25 Hydroxy; Future    3. Anxiety, generalized  -     Comprehensive Metabolic Panel; Future  -     CBC & Differential; Future  -     Lipid Panel; Future  -     Vitamin D 25 Hydroxy; Future    4. Hypertension, essential  -     Comprehensive Metabolic Panel; Future  -     CBC & Differential; Future  -     Lipid Panel; Future  -     Vitamin D 25 Hydroxy; Future    5. Mixed hyperlipidemia  -     Comprehensive Metabolic Panel; Future  -     CBC & Differential; Future  -     Lipid  Panel; Future  -     Vitamin D 25 Hydroxy; Future    6. Elevated liver enzymes  -     Comprehensive Metabolic Panel; Future  -     CBC & Differential; Future  -     Lipid Panel; Future  -     Vitamin D 25 Hydroxy; Future    7. Bilateral leg edema  -     Comprehensive Metabolic Panel; Future  -     CBC & Differential; Future  -     Lipid Panel; Future  -     Vitamin D 25 Hydroxy; Future        Dyspnea/Orthopnea -patieent had an echocardiogram showing some diastolic dysfunction LVH mild, normal ejection fraction, no significant valve abnnormalities, patient also had a exercise treadmill test which showed no significant findings although her rate pressure product was low she was unable to achieve a heart rate response adequate enough, so a subsequent nuclear stress test was performed May 14 , 2021,-reported as normal,-Chest x-ray showed no active disease April 2021    Elevated liver enzymes worsened over the past couple months, most likely due to nonalcoholic steatohepatitis is, ultrasound of the liver shows fatty liver, no lesions, hepatitis workup including hepatitis BC iron profile autoimmune hepatitis on negative May 2021,--rec WGT LOSS, AND STOP DICLOFENAC --RISK OF CIRROHSIS AND LIVER DAMAGE DISCUSSED WITH PT AND  AT [facility] MAY 19 , 2021     HTN----continues LISINOPRIL 40 mg daily at bedtime, Tenoretic 50/25 mg every morning    LE EDEMA B/L--Left knee pain with increased swelling left lower leg September 2020 probable arthritis did seek orthopedic Dr. Johnson     [facility]--WGT LOSS DISCUSSED ,     VIT D LEVEL,--On replacement,    Right total knee arthroplasty    Primary hyperparathyroidism status post incomplete total parathyroidectomy 2009,    CAROL, Total BSO, rectocele repair, bladder repair surgery,  Previous breast biopsy,    Follow Up   Return in about 6 months (around 5/23/2022).  Patient was given instructions and counseling regarding her condition or for health maintenance advice. Please see  specific information pulled into the AVS if appropriate.

## 2021-11-23 ENCOUNTER — OFFICE VISIT (OUTPATIENT)
Dept: INTERNAL MEDICINE | Facility: CLINIC | Age: 61
End: 2021-11-23

## 2021-11-23 VITALS
OXYGEN SATURATION: 98 % | SYSTOLIC BLOOD PRESSURE: 114 MMHG | TEMPERATURE: 97.1 F | HEART RATE: 70 BPM | DIASTOLIC BLOOD PRESSURE: 72 MMHG | WEIGHT: 266.2 LBS | BODY MASS INDEX: 39.43 KG/M2 | HEIGHT: 69 IN

## 2021-11-23 DIAGNOSIS — E66.09 CLASS 2 OBESITY DUE TO EXCESS CALORIES WITHOUT SERIOUS COMORBIDITY WITH BODY MASS INDEX (BMI) OF 37.0 TO 37.9 IN ADULT: Primary | ICD-10-CM

## 2021-11-23 DIAGNOSIS — E55.9 VITAMIN D DEFICIENCY: ICD-10-CM

## 2021-11-23 DIAGNOSIS — I10 HYPERTENSION, ESSENTIAL: ICD-10-CM

## 2021-11-23 DIAGNOSIS — F41.1 ANXIETY, GENERALIZED: ICD-10-CM

## 2021-11-23 DIAGNOSIS — R60.0 BILATERAL LEG EDEMA: ICD-10-CM

## 2021-11-23 DIAGNOSIS — E78.2 MIXED HYPERLIPIDEMIA: ICD-10-CM

## 2021-11-23 DIAGNOSIS — R74.8 ELEVATED LIVER ENZYMES: ICD-10-CM

## 2021-11-23 PROBLEM — E66.812 CLASS 2 OBESITY DUE TO EXCESS CALORIES WITHOUT SERIOUS COMORBIDITY WITH BODY MASS INDEX (BMI) OF 37.0 TO 37.9 IN ADULT: Status: ACTIVE | Noted: 2021-11-23

## 2021-11-23 PROCEDURE — 99214 OFFICE O/P EST MOD 30 MIN: CPT | Performed by: INTERNAL MEDICINE

## 2021-12-09 ENCOUNTER — OFFICE VISIT (OUTPATIENT)
Dept: INTERNAL MEDICINE | Facility: CLINIC | Age: 61
End: 2021-12-09

## 2021-12-09 VITALS
SYSTOLIC BLOOD PRESSURE: 128 MMHG | BODY MASS INDEX: 39.75 KG/M2 | HEART RATE: 70 BPM | WEIGHT: 268.4 LBS | OXYGEN SATURATION: 97 % | RESPIRATION RATE: 18 BRPM | DIASTOLIC BLOOD PRESSURE: 80 MMHG | HEIGHT: 69 IN | TEMPERATURE: 98 F

## 2021-12-09 DIAGNOSIS — Z12.31 ENCOUNTER FOR SCREENING MAMMOGRAM FOR MALIGNANT NEOPLASM OF BREAST: ICD-10-CM

## 2021-12-09 DIAGNOSIS — J06.0 SORE THROAT AND LARYNGITIS: ICD-10-CM

## 2021-12-09 DIAGNOSIS — J06.9 ACUTE URI: Primary | ICD-10-CM

## 2021-12-09 DIAGNOSIS — H69.83 DYSFUNCTION OF BOTH EUSTACHIAN TUBES: ICD-10-CM

## 2021-12-09 PROBLEM — H69.93 DYSFUNCTION OF BOTH EUSTACHIAN TUBES: Status: ACTIVE | Noted: 2021-12-09

## 2021-12-09 LAB
EXPIRATION DATE: NORMAL
INTERNAL CONTROL: NORMAL
Lab: NORMAL
S PYO AG THROAT QL: NEGATIVE

## 2021-12-09 PROCEDURE — 87880 STREP A ASSAY W/OPTIC: CPT

## 2021-12-09 PROCEDURE — 99213 OFFICE O/P EST LOW 20 MIN: CPT

## 2021-12-09 RX ORDER — AZITHROMYCIN 250 MG/1
TABLET, FILM COATED ORAL
Qty: 6 TABLET | Refills: 0 | Status: SHIPPED | OUTPATIENT
Start: 2021-12-09 | End: 2021-12-14

## 2021-12-09 RX ORDER — PREDNISONE 20 MG/1
20 TABLET ORAL 2 TIMES DAILY
Qty: 10 TABLET | Refills: 0 | Status: SHIPPED | OUTPATIENT
Start: 2021-12-09 | End: 2021-12-14

## 2021-12-09 RX ORDER — ATENOLOL AND CHLORTHALIDONE TABLET 50; 25 MG/1; MG/1
1 TABLET ORAL EVERY MORNING
COMMUNITY
Start: 2021-09-17 | End: 2021-12-18

## 2021-12-09 RX ORDER — DICLOFENAC SODIUM 75 MG/1
75 TABLET, DELAYED RELEASE ORAL DAILY
COMMUNITY
Start: 2021-10-27 | End: 2022-05-24

## 2021-12-09 RX ORDER — LISINOPRIL 40 MG/1
40 TABLET ORAL DAILY
COMMUNITY
Start: 2021-09-17 | End: 2021-12-18

## 2021-12-09 NOTE — ASSESSMENT & PLAN NOTE
Patient complains of URI symptoms for a few weeks. She states that it started off as a dry cough but now she has a sore throat and left ear pain/discomfort. She denies any fevers, chills, body aches, nasal congestion, sinus headaches/pressure. Denies soa or wheezing.  She has been taking mucinex.  Bilateral ear effusions. Throat is erythematous with exudate noted. Lungs clear.   Strep neg  Plan: Start zpak and prednisone. Discussed that daily allergy pill, flonase would also help with her ears as well.

## 2021-12-09 NOTE — ASSESSMENT & PLAN NOTE
Complains of left ear discomfort. She states she has noticed for awhile she will pull at it because it itches. Denies allergies.  Assessment: Eustachian tube dysfunction.  Plan: Discussed with patient daily allergy pill and flonase could be beneficial.   She will also be started on prednisone.

## 2021-12-09 NOTE — PROGRESS NOTES
"Chief Complaint  Cough and Sore Throat (strep)    Subjective          History of Present Illness  Keke Ureña presents to [facility]  Started out as a cough a couple of weeks ago, now she has a sore throat and left ear pain. No fevers, chills, body aches. Denies nasal congestion, sinus pressure, or headaches.   She has taken mucinex.     COVID-19: no known exposure, has had both vaccines plus booster.  recently tested negative.  Flu-Has already had    Objective   Vital Signs:   /80 (BP Location: Left arm, Patient Position: Sitting, Cuff Size: Large Adult)   Pulse 70   Temp 98 °F (36.7 °C) (Temporal)   Resp 18   Ht 175.3 cm (69.02\")   Wt 122 kg (268 lb 6.4 oz)   SpO2 97%   BMI 39.61 kg/m²     Physical Exam  HENT:      Right Ear: A middle ear effusion is present.      Left Ear: A middle ear effusion is present.      Ears:      Comments: Left worse than the right     Mouth/Throat:      Pharynx: Oropharyngeal exudate and posterior oropharyngeal erythema present.      Tonsils: Tonsillar exudate present.   Eyes:      Extraocular Movements: Extraocular movements intact.      Conjunctiva/sclera: Conjunctivae normal.   Cardiovascular:      Rate and Rhythm: Normal rate and regular rhythm.      Pulses: Normal pulses.      Heart sounds: No murmur heard.      Pulmonary:      Effort: Pulmonary effort is normal. No respiratory distress.      Breath sounds: Normal breath sounds. No stridor. No wheezing, rhonchi or rales.   Chest:      Chest wall: No tenderness.   Abdominal:      General: Bowel sounds are normal.      Palpations: Abdomen is soft.   Musculoskeletal:         General: Normal range of motion.      Cervical back: Normal range of motion.   Lymphadenopathy:      Cervical: No cervical adenopathy.   Skin:     General: Skin is warm and dry.   Neurological:      Mental Status: She is alert and oriented to person, place, and time.   Psychiatric:         Mood and " Affect: Mood normal.         Behavior: Behavior normal.         Thought Content: Thought content normal.         Judgment: Judgment normal.        Result Review :                 Assessment and Plan    Diagnoses and all orders for this visit:    1. Acute URI (Primary)  Assessment & Plan:  Patient complains of URI symptoms for a few weeks. She states that it started off as a dry cough but now she has a sore throat and left ear pain/discomfort. She denies any fevers, chills, body aches, nasal congestion, sinus headaches/pressure. Denies soa or wheezing.  She has been taking mucinex.  Bilateral ear effusions. Throat is erythematous with exudate noted. Lungs clear.   Strep neg  Plan: Start zpak and prednisone. Discussed that daily allergy pill, flonase would also help with her ears as well.       2. Dysfunction of both eustachian tubes  Assessment & Plan:  Complains of left ear discomfort. She states she has noticed for awhile she will pull at it because it itches. Denies allergies.  Assessment: Eustachian tube dysfunction.  Plan: Discussed with patient daily allergy pill and flonase could be beneficial.   She will also be started on prednisone.       3. Sore throat and laryngitis  -     POCT rapid strep A    4. Encounter for screening mammogram for malignant neoplasm of breast  -     Mammo Screening Bilateral With CAD; Future    Other orders  -     predniSONE (DELTASONE) 20 MG tablet; Take 1 tablet by mouth 2 (Two) Times a Day for 5 days.  Dispense: 10 tablet; Refill: 0  -     azithromycin (Zithromax Z-Juwan) 250 MG tablet; Take 2 tablets the first day, then 1 tablet daily for 4 days.  Dispense: 6 tablet; Refill: 0      Follow Up   No follow-ups on file.  Patient was given instructions and counseling regarding her condition or for health maintenance advice. Please see specific information pulled into the AVS if appropriate.

## 2021-12-10 ENCOUNTER — TRANSCRIBE ORDERS (OUTPATIENT)
Dept: ADMINISTRATIVE | Facility: HOSPITAL | Age: 61
End: 2021-12-10

## 2021-12-10 DIAGNOSIS — Z12.31 SCREENING MAMMOGRAM, ENCOUNTER FOR: Primary | ICD-10-CM

## 2021-12-14 ENCOUNTER — HOSPITAL ENCOUNTER (OUTPATIENT)
Dept: MAMMOGRAPHY | Facility: HOSPITAL | Age: 61
Discharge: HOME | End: 2021-12-14

## 2021-12-14 DIAGNOSIS — Z12.31 SCREENING MAMMOGRAM, ENCOUNTER FOR: ICD-10-CM

## 2021-12-14 PROCEDURE — 77067 SCR MAMMO BI INCL CAD: CPT

## 2021-12-14 PROCEDURE — 77063 BREAST TOMOSYNTHESIS BI: CPT

## 2021-12-18 RX ORDER — LISINOPRIL 40 MG/1
TABLET ORAL
Qty: 90 TABLET | Refills: 3 | Status: SHIPPED | OUTPATIENT
Start: 2021-12-18 | End: 2022-12-15

## 2021-12-18 RX ORDER — ATENOLOL AND CHLORTHALIDONE TABLET 50; 25 MG/1; MG/1
TABLET ORAL
Qty: 90 TABLET | Refills: 3 | Status: SHIPPED | OUTPATIENT
Start: 2021-12-18 | End: 2022-12-15

## 2022-05-24 ENCOUNTER — OFFICE VISIT (OUTPATIENT)
Dept: INTERNAL MEDICINE | Facility: CLINIC | Age: 62
End: 2022-05-24

## 2022-05-24 VITALS
SYSTOLIC BLOOD PRESSURE: 124 MMHG | DIASTOLIC BLOOD PRESSURE: 84 MMHG | BODY MASS INDEX: 40.52 KG/M2 | OXYGEN SATURATION: 96 % | HEIGHT: 69 IN | WEIGHT: 273.6 LBS | HEART RATE: 64 BPM | TEMPERATURE: 97.8 F

## 2022-05-24 DIAGNOSIS — F41.1 ANXIETY, GENERALIZED: ICD-10-CM

## 2022-05-24 DIAGNOSIS — E78.2 MIXED HYPERLIPIDEMIA: ICD-10-CM

## 2022-05-24 DIAGNOSIS — I10 HYPERTENSION, ESSENTIAL: ICD-10-CM

## 2022-05-24 DIAGNOSIS — E55.9 VITAMIN D DEFICIENCY: ICD-10-CM

## 2022-05-24 DIAGNOSIS — R60.0 BILATERAL LEG EDEMA: Primary | ICD-10-CM

## 2022-05-24 DIAGNOSIS — R74.8 ELEVATED LIVER ENZYMES: ICD-10-CM

## 2022-05-24 DIAGNOSIS — E66.09 CLASS 2 OBESITY DUE TO EXCESS CALORIES WITHOUT SERIOUS COMORBIDITY WITH BODY MASS INDEX (BMI) OF 37.0 TO 37.9 IN ADULT: ICD-10-CM

## 2022-05-24 PROCEDURE — 99214 OFFICE O/P EST MOD 30 MIN: CPT | Performed by: INTERNAL MEDICINE

## 2022-05-24 NOTE — PROGRESS NOTES
"Chief Complaint/ HPI: Patient is here to follow-up with elevated liver enzymes blood pressure concerns and routine checkup, review labs,          Objective   Vital Signs  Vitals:    05/24/22 1152   BP: 124/84   Pulse: 64   Temp: 97.8 °F (36.6 °C)   SpO2: 96%   Weight: 124 kg (273 lb 9.6 oz)   Height: 175.3 cm (69.02\")      Body mass index is 40.38 kg/m².  Review of Systems   Constitutional: Negative.    HENT: Negative.    Eyes: Negative.    Respiratory: Negative.    Cardiovascular: Negative.    Gastrointestinal: Negative.    Endocrine: Negative.    Genitourinary: Negative.    Musculoskeletal: Negative.    Allergic/Immunologic: Negative.    Neurological: Negative.    Hematological: Negative.    Psychiatric/Behavioral: Negative.       Physical Exam  Constitutional:       General: She is not in acute distress.     Appearance: Normal appearance. She is obese.   HENT:      Head: Normocephalic.      Mouth/Throat:      Mouth: Mucous membranes are moist.   Eyes:      Conjunctiva/sclera: Conjunctivae normal.      Pupils: Pupils are equal, round, and reactive to light.   Cardiovascular:      Rate and Rhythm: Normal rate and regular rhythm.      Pulses: Normal pulses.      Heart sounds: Normal heart sounds.   Pulmonary:      Effort: Pulmonary effort is normal.      Breath sounds: Normal breath sounds.   Abdominal:      General: Bowel sounds are normal.      Palpations: Abdomen is soft.   Musculoskeletal:         General: No swelling. Normal range of motion.      Cervical back: Neck supple.   Skin:     General: Skin is warm and dry.      Coloration: Skin is not jaundiced.   Neurological:      General: No focal deficit present.      Mental Status: She is alert and oriented to person, place, and time. Mental status is at baseline.   Psychiatric:         Mood and Affect: Mood normal.         Behavior: Behavior normal.         Thought Content: Thought content normal.         Judgment: Judgment normal.        Result Review :   Lab " Results   Component Value Date     03/22/2021             Lab Results   Component Value Date    TSH 2.190 03/22/2021    TSH 2.070 06/23/2020    TSH 2.310 12/19/2019      Lab Results   Component Value Date    FREET4 1.4 03/22/2021    FREET4 1.4 06/23/2020    FREET4 1.4 12/19/2019                          Visit Diagnoses:    ICD-10-CM ICD-9-CM   1. Bilateral leg edema  R60.0 782.3   2. Vitamin D deficiency  E55.9 268.9   3. Mixed hyperlipidemia  E78.2 272.2   4. Elevated liver enzymes  R74.8 790.5   5. Anxiety, generalized  F41.1 300.02   6. Class 2 obesity due to excess calories without serious comorbidity with body mass index (BMI) of 37.0 to 37.9 in adult  E66.09 278.00    Z68.37 V85.37   7. Hypertension, essential  I10 401.9       Assessment and Plan   Diagnoses and all orders for this visit:    1. Bilateral leg edema (Primary)  -     CBC & Differential; Future  -     Comprehensive Metabolic Panel; Future  -     Lipid Panel; Future    2. Vitamin D deficiency  -     CBC & Differential; Future  -     Comprehensive Metabolic Panel; Future  -     Lipid Panel; Future    3. Mixed hyperlipidemia  -     CBC & Differential; Future  -     Comprehensive Metabolic Panel; Future  -     Lipid Panel; Future    4. Elevated liver enzymes  -     CBC & Differential; Future  -     Comprehensive Metabolic Panel; Future  -     Lipid Panel; Future    5. Anxiety, generalized  -     CBC & Differential; Future  -     Comprehensive Metabolic Panel; Future  -     Lipid Panel; Future    6. Class 2 obesity due to excess calories without serious comorbidity with body mass index (BMI) of 37.0 to 37.9 in adult  -     CBC & Differential; Future  -     Comprehensive Metabolic Panel; Future  -     Lipid Panel; Future    7. Hypertension, essential  -     CBC & Differential; Future  -     Comprehensive Metabolic Panel; Future  -     Lipid Panel; Future    Other orders  -     Liraglutide (SAXENDA) 18 MG/3ML injection pen; Inject 3 mg under the  skin into the appropriate area as directed Daily.  Dispense: 5 pen; Refill: 3        Dyspnea/Orthopnea -patieent had an echocardiogram showing some diastolic dysfunction LVH mild, normal ejection fraction, no significant valve abnnormalities, patient also had a exercise treadmill test which showed no significant findings although her rate pressure product was low she was unable to achieve a heart rate response adequate enough, so a subsequent nuclear stress test was performed May 14 , 2021,-reported as normal,-Chest x-ray showed no active disease April 2021    Elevated liver enzymes worsened over the past couple months, most likely due to nonalcoholic steatohepatitis is, ultrasound of the liver shows fatty liver, no lesions, hepatitis workup including hepatitis BC iron profile autoimmune hepatitis on negative May 2021,--rec WGT LOSS, AND STOP DICLOFENAC --RISK OF CIRROHSIS AND LIVER DAMAGE DISCUSSED WITH PT AND  AT [facility] MAY 19 , 2021 ---off diclofenac may 2022---    HTN----continues LISINOPRIL 40 mg daily at bedtime, Tenoretic 50/25 mg every morning    LE EDEMA B/L--Left knee pain with increased swelling left lower leg September 2020 probable arthritis did seek orthopedic Dr. Johnson     Located within [facility]--WGT LOSS DISCUSSED , May 2022 consider saxenda injections discussed with patient coupon card given May 2022, will follow-up in a couple months to see if it is working with weight loss and check her chemistry panel,    VIT D LEVEL,--On replacement,    Right total knee arthroplasty    Primary hyperparathyroidism status post incomplete total parathyroidectomy 2009,    CAROL, Total BSO, rectocele repair, bladder repair surgery,  Previous breast biopsy,            Follow Up   Return in about 3 months (around 8/24/2022).  Patient was given instructions and counseling regarding her condition or for health maintenance advice. Please see specific information pulled into the AVS if appropriate.

## 2022-06-09 ENCOUNTER — TELEPHONE (OUTPATIENT)
Dept: INTERNAL MEDICINE | Facility: CLINIC | Age: 62
End: 2022-06-09

## 2022-06-09 NOTE — TELEPHONE ENCOUNTER
Caller: Keke Ureña    Relationship to patient: Self    Best call back number: [phone]    Patient is needing: PATIENT CALLED IN AND SAID THAT SHE NEEDS TO HAVE A BIOMETRIC SCREENING FORM FILLED OUT BEFORE THE END OF THE MONTH FOR INSURANCE. PATIENT WAS SEEN ON 5/24/2022. PATIENT WOULD LIKE TO KNOW IF SHE CAN BRING FORMS TO OFFICE TO HAVE THEM FILLED OUT OR IF SHE NEEDS ANOTHER APPOINTMENT. PLEASE CALL AND ADVISE.

## 2022-06-15 ENCOUNTER — OFFICE VISIT (OUTPATIENT)
Dept: INTERNAL MEDICINE | Facility: CLINIC | Age: 62
End: 2022-06-15

## 2022-06-15 VITALS
TEMPERATURE: 97.3 F | HEIGHT: 69 IN | DIASTOLIC BLOOD PRESSURE: 72 MMHG | WEIGHT: 263.4 LBS | OXYGEN SATURATION: 94 % | BODY MASS INDEX: 39.01 KG/M2 | HEART RATE: 75 BPM | SYSTOLIC BLOOD PRESSURE: 110 MMHG

## 2022-06-15 DIAGNOSIS — E66.09 CLASS 2 OBESITY DUE TO EXCESS CALORIES WITHOUT SERIOUS COMORBIDITY WITH BODY MASS INDEX (BMI) OF 37.0 TO 37.9 IN ADULT: Primary | ICD-10-CM

## 2022-06-15 DIAGNOSIS — E78.2 MIXED HYPERLIPIDEMIA: ICD-10-CM

## 2022-06-15 PROCEDURE — 99213 OFFICE O/P EST LOW 20 MIN: CPT | Performed by: INTERNAL MEDICINE

## 2022-06-15 NOTE — PROGRESS NOTES
"Chief Complaint/ HPI: Patient is here to follow-up with paperwork for her employer, needing biometric screening filled out, she is otherwise doing well, she is lost over 10 pounds since I last saw her, she is doing really well with her weight loss efforts,          Objective   Vital Signs  Vitals:    06/15/22 1115   BP: 110/72   Pulse: 75   Temp: 97.3 °F (36.3 °C)   SpO2: 94%   Weight: 119 kg (263 lb 6.4 oz)   Height: 175.3 cm (69.02\")      Body mass index is 38.88 kg/m².  Review of Systems   Constitutional: Negative.    HENT: Negative.    Eyes: Negative.    Respiratory: Negative.    Cardiovascular: Negative.    Gastrointestinal: Negative.    Endocrine: Negative.    Genitourinary: Negative.    Musculoskeletal: Negative.    Allergic/Immunologic: Negative.    Neurological: Negative.    Hematological: Negative.    Psychiatric/Behavioral: Negative.       Physical Exam  Constitutional:       General: She is not in acute distress.     Appearance: Normal appearance.   HENT:      Head: Normocephalic.      Mouth/Throat:      Mouth: Mucous membranes are moist.   Eyes:      Conjunctiva/sclera: Conjunctivae normal.      Pupils: Pupils are equal, round, and reactive to light.   Cardiovascular:      Rate and Rhythm: Normal rate and regular rhythm.      Pulses: Normal pulses.      Heart sounds: Normal heart sounds.   Pulmonary:      Effort: Pulmonary effort is normal.      Breath sounds: Normal breath sounds.   Abdominal:      General: Abdomen is flat. Bowel sounds are normal.      Palpations: Abdomen is soft.   Musculoskeletal:         General: No swelling. Normal range of motion.      Cervical back: Neck supple.   Skin:     General: Skin is warm and dry.      Coloration: Skin is not jaundiced.   Neurological:      General: No focal deficit present.      Mental Status: She is alert and oriented to person, place, and time. Mental status is at baseline.   Psychiatric:         Mood and Affect: Mood normal.         Behavior: Behavior " normal.         Thought Content: Thought content normal.         Judgment: Judgment normal.        Result Review :   Lab Results   Component Value Date     03/22/2021             Lab Results   Component Value Date    TSH 2.190 03/22/2021    TSH 2.070 06/23/2020    TSH 2.310 12/19/2019      Lab Results   Component Value Date    FREET4 1.4 03/22/2021    FREET4 1.4 06/23/2020    FREET4 1.4 12/19/2019                          Visit Diagnoses:    ICD-10-CM ICD-9-CM   1. Class 2 obesity due to excess calories without serious comorbidity with body mass index (BMI) of 37.0 to 37.9 in adult  E66.09 278.00    Z68.37 V85.37   2. Mixed hyperlipidemia  E78.2 272.2       Assessment and Plan   Diagnoses and all orders for this visit:    1. Class 2 obesity due to excess calories without serious comorbidity with body mass index (BMI) of 37.0 to 37.9 in adult (Primary)    2. Mixed hyperlipidemia         [facility]--WGT LOSS DISCUSSED , May 2022, patient is done really well with the use of Saxenda, she is lost over 10 pounds since last visit which was about 3 weeks ago, as of Kamini 15, 2022    Work form for insurance and cholesterol numbers filled out Kamini 15, 2022 patient is to do repeat lab work soon in August prior to her next regular visit for weight loss efforts, discussed    Dyspnea/Orthopnea -patieent had an echocardiogram showing some diastolic dysfunction LVH mild, normal ejection fraction, no significant valve abnnormalities, patient also had a exercise treadmill test which showed no significant findings although her rate pressure product was low she was unable to achieve a heart rate response adequate enough, so a subsequent nuclear stress test was performed May 14 , 2021,-reported as normal,-Chest x-ray showed no active disease April 2021    Elevated liver enzymes worsened over the past couple months, most likely due to nonalcoholic steatohepatitis is, ultrasound of the liver shows fatty liver, no lesions, hepatitis  workup including hepatitis BC iron profile autoimmune hepatitis on negative May 2021,--rec WGT LOSS, AND STOP DICLOFENAC --RISK OF CIRROHSIS AND LIVER DAMAGE [facility] ---off diclofenac may 2022---    HTN----continues LISINOPRIL 40 mg daily at bedtime, Tenoretic 50/25 mg every morning    LE EDEMA B/L--Left knee pain with increased swelling left lower leg September 2020 probable arthritis did seek orthopedic Dr. Johnson,     VIT D LEVEL,--On replacement,    Right total knee arthroplasty    Primary hyperparathyroidism status post incomplete total parathyroidectomy 2009,    CAROL, Total BSO, rectocele repair, bladder repair surgery,  Previous breast biopsy,                  Follow Up   No follow-ups on file.  Patient was given instructions and counseling regarding her condition or for health maintenance advice. Please see specific information pulled into the AVS if appropriate.

## 2022-07-18 ENCOUNTER — TELEPHONE (OUTPATIENT)
Dept: INTERNAL MEDICINE | Facility: CLINIC | Age: 62
End: 2022-07-18

## 2022-08-30 ENCOUNTER — LAB (OUTPATIENT)
Dept: LAB | Facility: HOSPITAL | Age: 62
End: 2022-08-30

## 2022-08-30 DIAGNOSIS — E78.2 MIXED HYPERLIPIDEMIA: ICD-10-CM

## 2022-08-30 DIAGNOSIS — R60.0 BILATERAL LEG EDEMA: ICD-10-CM

## 2022-08-30 DIAGNOSIS — I10 HYPERTENSION, ESSENTIAL: ICD-10-CM

## 2022-08-30 DIAGNOSIS — E55.9 VITAMIN D DEFICIENCY: ICD-10-CM

## 2022-08-30 DIAGNOSIS — R74.8 ELEVATED LIVER ENZYMES: ICD-10-CM

## 2022-08-30 DIAGNOSIS — E66.09 CLASS 2 OBESITY DUE TO EXCESS CALORIES WITHOUT SERIOUS COMORBIDITY WITH BODY MASS INDEX (BMI) OF 37.0 TO 37.9 IN ADULT: ICD-10-CM

## 2022-08-30 DIAGNOSIS — F41.1 ANXIETY, GENERALIZED: ICD-10-CM

## 2022-08-30 LAB
25(OH)D3 SERPL-MCNC: 33.9 NG/ML (ref 30–100)
ALBUMIN SERPL-MCNC: 4.2 G/DL (ref 3.5–5.2)
ALBUMIN/GLOB SERPL: 1.5 G/DL
ALP SERPL-CCNC: 58 U/L (ref 39–117)
ALT SERPL W P-5'-P-CCNC: 41 U/L (ref 1–33)
ANION GAP SERPL CALCULATED.3IONS-SCNC: 12.3 MMOL/L (ref 5–15)
AST SERPL-CCNC: 33 U/L (ref 1–32)
BASOPHILS # BLD AUTO: 0.07 10*3/MM3 (ref 0–0.2)
BASOPHILS NFR BLD AUTO: 1 % (ref 0–1.5)
BILIRUB SERPL-MCNC: 0.6 MG/DL (ref 0–1.2)
BUN SERPL-MCNC: 22 MG/DL (ref 8–23)
BUN/CREAT SERPL: 22.9 (ref 7–25)
CALCIUM SPEC-SCNC: 9.3 MG/DL (ref 8.6–10.5)
CHLORIDE SERPL-SCNC: 102 MMOL/L (ref 98–107)
CHOLEST SERPL-MCNC: 170 MG/DL (ref 0–200)
CO2 SERPL-SCNC: 27.7 MMOL/L (ref 22–29)
CREAT SERPL-MCNC: 0.96 MG/DL (ref 0.57–1)
DEPRECATED RDW RBC AUTO: 41 FL (ref 37–54)
EGFRCR SERPLBLD CKD-EPI 2021: 67 ML/MIN/1.73
EOSINOPHIL # BLD AUTO: 0.19 10*3/MM3 (ref 0–0.4)
EOSINOPHIL NFR BLD AUTO: 2.6 % (ref 0.3–6.2)
ERYTHROCYTE [DISTWIDTH] IN BLOOD BY AUTOMATED COUNT: 12.7 % (ref 12.3–15.4)
GLOBULIN UR ELPH-MCNC: 2.8 GM/DL
GLUCOSE SERPL-MCNC: 94 MG/DL (ref 65–99)
HCT VFR BLD AUTO: 40.2 % (ref 34–46.6)
HDLC SERPL-MCNC: 36 MG/DL (ref 40–60)
HGB BLD-MCNC: 13.8 G/DL (ref 12–15.9)
IMM GRANULOCYTES # BLD AUTO: 0.03 10*3/MM3 (ref 0–0.05)
IMM GRANULOCYTES NFR BLD AUTO: 0.4 % (ref 0–0.5)
LDLC SERPL CALC-MCNC: 106 MG/DL (ref 0–100)
LDLC/HDLC SERPL: 2.84 {RATIO}
LYMPHOCYTES # BLD AUTO: 2.69 10*3/MM3 (ref 0.7–3.1)
LYMPHOCYTES NFR BLD AUTO: 37.2 % (ref 19.6–45.3)
MCH RBC QN AUTO: 30.7 PG (ref 26.6–33)
MCHC RBC AUTO-ENTMCNC: 34.3 G/DL (ref 31.5–35.7)
MCV RBC AUTO: 89.5 FL (ref 79–97)
MONOCYTES # BLD AUTO: 0.58 10*3/MM3 (ref 0.1–0.9)
MONOCYTES NFR BLD AUTO: 8 % (ref 5–12)
NEUTROPHILS NFR BLD AUTO: 3.67 10*3/MM3 (ref 1.7–7)
NEUTROPHILS NFR BLD AUTO: 50.8 % (ref 42.7–76)
NRBC BLD AUTO-RTO: 0 /100 WBC (ref 0–0.2)
PLATELET # BLD AUTO: 287 10*3/MM3 (ref 140–450)
PMV BLD AUTO: 9.3 FL (ref 6–12)
POTASSIUM SERPL-SCNC: 3.7 MMOL/L (ref 3.5–5.2)
PROT SERPL-MCNC: 7 G/DL (ref 6–8.5)
RBC # BLD AUTO: 4.49 10*6/MM3 (ref 3.77–5.28)
SODIUM SERPL-SCNC: 142 MMOL/L (ref 136–145)
TRIGL SERPL-MCNC: 159 MG/DL (ref 0–150)
VLDLC SERPL-MCNC: 28 MG/DL (ref 5–40)
WBC NRBC COR # BLD: 7.23 10*3/MM3 (ref 3.4–10.8)

## 2022-08-30 PROCEDURE — 82306 VITAMIN D 25 HYDROXY: CPT

## 2022-08-30 PROCEDURE — 85025 COMPLETE CBC W/AUTO DIFF WBC: CPT

## 2022-08-30 PROCEDURE — 80061 LIPID PANEL: CPT

## 2022-08-30 PROCEDURE — 80053 COMPREHEN METABOLIC PANEL: CPT

## 2022-08-30 PROCEDURE — 36415 COLL VENOUS BLD VENIPUNCTURE: CPT

## 2022-09-01 ENCOUNTER — OFFICE VISIT (OUTPATIENT)
Dept: INTERNAL MEDICINE | Facility: CLINIC | Age: 62
End: 2022-09-01

## 2022-09-01 VITALS
SYSTOLIC BLOOD PRESSURE: 112 MMHG | OXYGEN SATURATION: 96 % | HEART RATE: 77 BPM | TEMPERATURE: 97.1 F | DIASTOLIC BLOOD PRESSURE: 70 MMHG | HEIGHT: 69 IN | WEIGHT: 247.8 LBS | BODY MASS INDEX: 36.7 KG/M2

## 2022-09-01 DIAGNOSIS — R74.8 ELEVATED LIVER ENZYMES: ICD-10-CM

## 2022-09-01 DIAGNOSIS — E78.2 MIXED HYPERLIPIDEMIA: ICD-10-CM

## 2022-09-01 DIAGNOSIS — E55.9 VITAMIN D DEFICIENCY: ICD-10-CM

## 2022-09-01 DIAGNOSIS — F41.1 ANXIETY, GENERALIZED: ICD-10-CM

## 2022-09-01 DIAGNOSIS — E66.09 CLASS 2 OBESITY DUE TO EXCESS CALORIES WITHOUT SERIOUS COMORBIDITY WITH BODY MASS INDEX (BMI) OF 37.0 TO 37.9 IN ADULT: ICD-10-CM

## 2022-09-01 DIAGNOSIS — I10 HYPERTENSION, ESSENTIAL: ICD-10-CM

## 2022-09-01 DIAGNOSIS — R60.0 BILATERAL LEG EDEMA: Primary | ICD-10-CM

## 2022-09-01 PROCEDURE — 99214 OFFICE O/P EST MOD 30 MIN: CPT | Performed by: INTERNAL MEDICINE

## 2022-09-01 NOTE — PROGRESS NOTES
"Chief Complaint/ HPI: --Patient is here to follow-up with weight loss use of Saxenda, says she is doing well with that she is up at the 3 mg dose and she is lost about 26-27 pounds since her last visit,, and in May 2022,          Objective   Vital Signs  Vitals:    09/01/22 1219   BP: 112/70   BP Location: Left arm   Patient Position: Sitting   Cuff Size: Large Adult   Pulse: 77   Temp: 97.1 °F (36.2 °C)   SpO2: 96%   Weight: 112 kg (247 lb 12.8 oz)   Height: 175.3 cm (69.02\")      Body mass index is 36.58 kg/m².  Review of Systems   Constitutional: Negative.    HENT: Negative.    Eyes: Negative.    Respiratory: Negative.    Cardiovascular: Negative.    Gastrointestinal: Negative.    Endocrine: Negative.    Genitourinary: Negative.    Musculoskeletal: Negative.    Allergic/Immunologic: Negative.    Neurological: Negative.    Hematological: Negative.    Psychiatric/Behavioral: Negative.       Physical Exam  Constitutional:       General: She is not in acute distress.     Appearance: Normal appearance.   HENT:      Head: Normocephalic.      Mouth/Throat:      Mouth: Mucous membranes are moist.   Eyes:      Conjunctiva/sclera: Conjunctivae normal.      Pupils: Pupils are equal, round, and reactive to light.   Cardiovascular:      Rate and Rhythm: Normal rate and regular rhythm.      Pulses: Normal pulses.      Heart sounds: Normal heart sounds.   Pulmonary:      Effort: Pulmonary effort is normal.      Breath sounds: Normal breath sounds.   Abdominal:      General: Abdomen is flat. Bowel sounds are normal.      Palpations: Abdomen is soft.   Musculoskeletal:         General: No swelling. Normal range of motion.      Cervical back: Neck supple.   Skin:     General: Skin is warm and dry.      Coloration: Skin is not jaundiced.   Neurological:      General: No focal deficit present.      Mental Status: She is alert and oriented to person, place, and time. Mental status is at baseline.   Psychiatric:         Mood and " Affect: Mood normal.         Behavior: Behavior normal.         Thought Content: Thought content normal.         Judgment: Judgment normal.        Result Review :   Lab Results   Component Value Date     03/22/2021     CMP    CMP 8/30/22   Glucose 94   BUN 22   Creatinine 0.96   Sodium 142   Potassium 3.7   Chloride 102   Calcium 9.3   Albumin 4.20   Total Bilirubin 0.6   Alkaline Phosphatase 58   AST (SGOT) 33 (A)   ALT (SGPT) 41 (A)   (A) Abnormal value            CBC w/diff    CBC w/Diff 8/30/22   WBC 7.23   RBC 4.49   Hemoglobin 13.8   Hematocrit 40.2   MCV 89.5   MCH 30.7   MCHC 34.3   RDW 12.7   Platelets 287   Neutrophil Rel % 50.8   Immature Granulocyte Rel % 0.4   Lymphocyte Rel % 37.2   Monocyte Rel % 8.0   Eosinophil Rel % 2.6   Basophil Rel % 1.0            Lipid Panel    Lipid Panel 8/30/22   Total Cholesterol 170   Triglycerides 159 (A)   HDL Cholesterol 36 (A)   VLDL Cholesterol 28   LDL Cholesterol  106 (A)   LDL/HDL Ratio 2.84   (A) Abnormal value             Lab Results   Component Value Date    TSH 2.190 03/22/2021    TSH 2.070 06/23/2020    TSH 2.310 12/19/2019      Lab Results   Component Value Date    FREET4 1.4 03/22/2021    FREET4 1.4 06/23/2020    FREET4 1.4 12/19/2019                          Visit Diagnoses:    ICD-10-CM ICD-9-CM   1. Bilateral leg edema  R60.0 782.3   2. Vitamin D deficiency  E55.9 268.9   3. Mixed hyperlipidemia  E78.2 272.2   4. Hypertension, essential  I10 401.9   5. Class 2 obesity due to excess calories without serious comorbidity with body mass index (BMI) of 37.0 to 37.9 in adult  E66.09 278.00    Z68.37 V85.37   6. Elevated liver enzymes  R74.8 790.5   7. Anxiety, generalized  F41.1 300.02       Assessment and Plan   Diagnoses and all orders for this visit:    1. Bilateral leg edema (Primary)  -     Comprehensive Metabolic Panel; Future    2. Vitamin D deficiency  -     Comprehensive Metabolic Panel; Future    3. Mixed hyperlipidemia  -     Comprehensive  Metabolic Panel; Future    4. Hypertension, essential  -     Comprehensive Metabolic Panel; Future    5. Class 2 obesity due to excess calories without serious comorbidity with body mass index (BMI) of 37.0 to 37.9 in adult  -     Comprehensive Metabolic Panel; Future    6. Elevated liver enzymes  -     Comprehensive Metabolic Panel; Future    7. Anxiety, generalized  -     Comprehensive Metabolic Panel; Future        OVERWGT--WGT LOSS DISCUSSED , May 2022, patient is done really well with the use of Saxenda, she is lost over 10 pounds since last visit which was about 3 weeks ago, as of Kamini 15, 2022, weight has been down a total of about 26 to 27 pounds over the past 5 months, as of September 1, 2022, continue, discussed use of blood pressure medications may need to decrease dose slightly if lightheadedness persists,    Dyspnea/Orthopnea -patieent had an echocardiogram showing some diastolic dysfunction LVH mild, normal ejection fraction, no significant valve abnnormalities, patient also had a exercise treadmill test which showed no significant findings although her rate pressure product was low she was unable to achieve a heart rate response adequate enough, so a subsequent nuclear stress test was performed May 14 , 2021,-reported as normal,-Chest x-ray showed no active disease April 2021    Elevated liver enzymes, most likely due to nonalcoholic steatohepatitis, numbers have improved considerably as of August 30, 2022 with weight loss and dietary changes, ---------- ultrasound of the liver shows fatty liver, no lesions, hepatitis workup including hepatitis BC iron profile autoimmune hepatitis on negative May 2021,--rec WGT LOSS, AND STOP DICLOFENAC --RISK OF CIRROHSIS AND LIVER DAMAGE DISCUSSED WITH PT AND  AT [facility] MAY 19 , 2021 ---off diclofenac may 2022---    HTN----continues LISINOPRIL 40 mg daily at bedtime, Tenoretic 50/25 mg every morning, patient may need to decrease her blood pressure medication  to half of the lisinopril dose if her blood pressures remain low where she feels lightheaded, discussed September 1, 2022    LE EDEMA B/L--Left knee pain with increased swelling left lower leg September 2020 probable arthritis did seek orthopedic Dr. Johnson,     VIT D LEVEL,--On replacement,    Right total knee arthroplasty    Primary hyperparathyroidism status post incomplete total parathyroidectomy 2009,    CAROL, Total BSO, rectocele repair, bladder repair surgery,  Previous breast biopsy,      Follow Up   Return in about 4 months (around 1/1/2023).  Patient was given instructions and counseling regarding her condition or for health maintenance advice. Please see specific information pulled into the AVS if appropriate.

## 2022-09-16 ENCOUNTER — TELEPHONE (OUTPATIENT)
Dept: INTERNAL MEDICINE | Facility: CLINIC | Age: 62
End: 2022-09-16

## 2022-09-16 NOTE — TELEPHONE ENCOUNTER
I spoke with the patient, she states she will take over the counter medication this weekend to see if it helps and if shes not any better shell call on Monday for an appt or will go to urgent care over the weekend if she gets worse

## 2022-09-16 NOTE — TELEPHONE ENCOUNTER
Caller: Keke Ureña    Relationship: Self    Best call back number: [phone]    What is the best time to reach you: ANYTIME    Who are you requesting to speak with (clinical staff, provider,  specific staff member): CLINICAL    What was the call regarding: PATIENT HAS NEVER HAD COVID AND IS NOW POSITIVE VIA HOME 9.16.22. PATIENT STATES SHE HAS NEVER HAD COVID AND NEEDS GUIDANCE ON HOW TO PROCEED WITH CARE. SYMPTOMS ARE;    SORE THROAT  CONGESTION   SINUS CONGESTION  BODY ACHES  CHILLS  BODY ACHES  DIARRHEA    Do you require a callback: YES

## 2022-09-20 ENCOUNTER — TELEPHONE (OUTPATIENT)
Dept: INTERNAL MEDICINE | Facility: CLINIC | Age: 62
End: 2022-09-20

## 2022-09-20 NOTE — TELEPHONE ENCOUNTER
Make her a telehealth visit with me this week to discuss and to get a note if she needs it for work

## 2022-09-20 NOTE — TELEPHONE ENCOUNTER
Caller: Keke Ureña    Relationship: Self    Best call back number: [phone]    What is the best time to reach you: ANY    Who are you requesting to speak with (clinical staff, provider,  specific staff member): CLINICAL    What was the call regarding: PATIENT WAS DIAGNOSED ON 9- WITH COVID. PATIENT NEEDS A DOCTORS NOTE BECAUSE SHE IS STILL TESTING POSITIVE WITH COVID AND STILL HAVING SYNONYMS AND NEED A DOCTOR NOT UNTIL September 27,2022.    Do you require a callback:

## 2022-09-21 ENCOUNTER — TELEMEDICINE (OUTPATIENT)
Dept: INTERNAL MEDICINE | Facility: CLINIC | Age: 62
End: 2022-09-21

## 2022-09-21 DIAGNOSIS — U07.1 COVID-19 VIRUS INFECTION: Primary | ICD-10-CM

## 2022-09-21 PROCEDURE — 99213 OFFICE O/P EST LOW 20 MIN: CPT | Performed by: INTERNAL MEDICINE

## 2022-09-21 NOTE — PROGRESS NOTES
Patient is here for telehealth visit, consents to visit, patient is being seen by audio and video     Chief Complaint/ HPI: positive covid , Friday sept 16, and still testing positive and needs work excuse ,   To be off till sept 27, 2022  Minimal sxs cough congestion runny nose not feeling too bad is gotten to feel better but still tested positive and so now has been put off work until September 27 and needs a doctor's note, so a FaceTime visit was done today with patient,      Objective   Vital Signs  There were no vitals filed for this visit.   There is no height or weight on file to calculate BMI.  Review of Systems no fevers, nausea vomiting,  Physical Exam patient is alert and oriented x3, no facial rashes throat is clear,  Result Review :   Lab Results   Component Value Date     03/22/2021     CMP    CMP 8/30/22   Glucose 94   BUN 22   Creatinine 0.96   Sodium 142   Potassium 3.7   Chloride 102   Calcium 9.3   Albumin 4.20   Total Bilirubin 0.6   Alkaline Phosphatase 58   AST (SGOT) 33 (A)   ALT (SGPT) 41 (A)   (A) Abnormal value            CBC w/diff    CBC w/Diff 8/30/22   WBC 7.23   RBC 4.49   Hemoglobin 13.8   Hematocrit 40.2   MCV 89.5   MCH 30.7   MCHC 34.3   RDW 12.7   Platelets 287   Neutrophil Rel % 50.8   Immature Granulocyte Rel % 0.4   Lymphocyte Rel % 37.2   Monocyte Rel % 8.0   Eosinophil Rel % 2.6   Basophil Rel % 1.0            Lipid Panel    Lipid Panel 8/30/22   Total Cholesterol 170   Triglycerides 159 (A)   HDL Cholesterol 36 (A)   VLDL Cholesterol 28   LDL Cholesterol  106 (A)   LDL/HDL Ratio 2.84   (A) Abnormal value             Lab Results   Component Value Date    TSH 2.190 03/22/2021    TSH 2.070 06/23/2020    TSH 2.310 12/19/2019      Lab Results   Component Value Date    FREET4 1.4 03/22/2021    FREET4 1.4 06/23/2020    FREET4 1.4 12/19/2019                          Visit Diagnoses:    ICD-10-CM ICD-9-CM   1. COVID-19 virus infection  U07.1 079.89       Assessment and Plan    Diagnoses and all orders for this visit:    1. COVID-19 virus infection (Primary)        covid infection, off work excuse till sept 27, 2022  Patient has minimal symptoms we will treat conservatively with vitamins zinc D and C,, a letter was sent for a work excuse and sent to patient through the [employer],      Follow Up   No follow-ups on file.  Patient was given instructions and counseling regarding her condition or for health maintenance advice. Please see specific information pulled into the AVS if appropriate.

## 2022-10-17 RX ORDER — LIRAGLUTIDE 6 MG/ML
INJECTION, SOLUTION SUBCUTANEOUS
Qty: 15 ML | Refills: 3 | Status: SHIPPED | OUTPATIENT
Start: 2022-10-17 | End: 2023-02-13

## 2022-12-15 RX ORDER — LISINOPRIL 40 MG/1
TABLET ORAL
Qty: 90 TABLET | Refills: 3 | Status: SHIPPED | OUTPATIENT
Start: 2022-12-15

## 2022-12-15 RX ORDER — ATENOLOL AND CHLORTHALIDONE TABLET 50; 25 MG/1; MG/1
TABLET ORAL
Qty: 90 TABLET | Refills: 3 | Status: SHIPPED | OUTPATIENT
Start: 2022-12-15

## 2023-01-03 ENCOUNTER — OFFICE VISIT (OUTPATIENT)
Dept: INTERNAL MEDICINE | Facility: CLINIC | Age: 63
End: 2023-01-03
Payer: COMMERCIAL

## 2023-01-03 VITALS
HEART RATE: 83 BPM | TEMPERATURE: 96.6 F | HEIGHT: 69 IN | BODY MASS INDEX: 34.87 KG/M2 | SYSTOLIC BLOOD PRESSURE: 107 MMHG | WEIGHT: 235.4 LBS | OXYGEN SATURATION: 94 % | DIASTOLIC BLOOD PRESSURE: 75 MMHG

## 2023-01-03 DIAGNOSIS — R74.8 ELEVATED LIVER ENZYMES: ICD-10-CM

## 2023-01-03 DIAGNOSIS — F41.1 ANXIETY, GENERALIZED: ICD-10-CM

## 2023-01-03 DIAGNOSIS — E55.9 VITAMIN D DEFICIENCY: Primary | ICD-10-CM

## 2023-01-03 DIAGNOSIS — E78.2 MIXED HYPERLIPIDEMIA: ICD-10-CM

## 2023-01-03 DIAGNOSIS — E66.09 CLASS 2 OBESITY DUE TO EXCESS CALORIES WITHOUT SERIOUS COMORBIDITY WITH BODY MASS INDEX (BMI) OF 37.0 TO 37.9 IN ADULT: ICD-10-CM

## 2023-01-03 DIAGNOSIS — I10 HYPERTENSION, ESSENTIAL: ICD-10-CM

## 2023-01-03 PROCEDURE — 99214 OFFICE O/P EST MOD 30 MIN: CPT | Performed by: INTERNAL MEDICINE

## 2023-01-03 NOTE — PROGRESS NOTES
CHIEF COMPLAINT:  Hypertension and Follow-up      HPI: Patient is here to follow-up with weight loss management blood pressure, no labs have been done this time, she is here to discuss the medications that she has been taking,        Objective   Vital Signs  Vitals:    01/03/23 0855   BP: 107/75   Pulse: 83   Temp: 96.6 °F (35.9 °C)   SpO2: 94%   Weight: 107 kg (235 lb 6.4 oz)   Height: 175.3 cm (69.02\")      Body mass index is 34.75 kg/m².  Review of Systems   Constitutional: Negative.    HENT: Negative.    Eyes: Negative.    Respiratory: Negative.    Cardiovascular: Negative.    Gastrointestinal: Negative.    Endocrine: Negative.    Genitourinary: Negative.    Musculoskeletal: Negative.    Allergic/Immunologic: Negative.    Neurological: Negative.    Hematological: Negative.    Psychiatric/Behavioral: Negative.       Physical Exam  Constitutional:       General: She is not in acute distress.     Appearance: Normal appearance.   HENT:      Head: Normocephalic.      Mouth/Throat:      Mouth: Mucous membranes are moist.   Eyes:      Conjunctiva/sclera: Conjunctivae normal.      Pupils: Pupils are equal, round, and reactive to light.   Cardiovascular:      Rate and Rhythm: Normal rate and regular rhythm.      Pulses: Normal pulses.      Heart sounds: Normal heart sounds.   Pulmonary:      Effort: Pulmonary effort is normal.      Breath sounds: Normal breath sounds.   Abdominal:      General: Abdomen is flat. Bowel sounds are normal.      Palpations: Abdomen is soft.   Musculoskeletal:         General: No swelling. Normal range of motion.      Cervical back: Neck supple.   Skin:     General: Skin is warm and dry.      Coloration: Skin is not jaundiced.   Neurological:      General: No focal deficit present.      Mental Status: She is alert and oriented to person, place, and time. Mental status is at baseline.   Psychiatric:         Mood and Affect: Mood normal.         Behavior: Behavior normal.         Thought  Content: Thought content normal.         Judgment: Judgment normal.        Result Review :   Lab Results   Component Value Date     03/22/2021     CMP    CMP 8/30/22   Glucose 94   BUN 22   Creatinine 0.96   eGFR 67.0   Sodium 142   Potassium 3.7   Chloride 102   Calcium 9.3   Total Protein 7.0   Albumin 4.20   Globulin 2.8   Total Bilirubin 0.6   Alkaline Phosphatase 58   AST (SGOT) 33 (A)   ALT (SGPT) 41 (A)   Albumin/Globulin Ratio 1.5   BUN/Creatinine Ratio 22.9   Anion Gap 12.3   (A) Abnormal value       Comments are available for some flowsheets but are not being displayed.           CBC w/diff    CBC w/Diff 8/30/22   WBC 7.23   RBC 4.49   Hemoglobin 13.8   Hematocrit 40.2   MCV 89.5   MCH 30.7   MCHC 34.3   RDW 12.7   Platelets 287   Neutrophil Rel % 50.8   Immature Granulocyte Rel % 0.4   Lymphocyte Rel % 37.2   Monocyte Rel % 8.0   Eosinophil Rel % 2.6   Basophil Rel % 1.0            Lipid Panel    Lipid Panel 8/30/22   Total Cholesterol 170   Triglycerides 159 (A)   HDL Cholesterol 36 (A)   VLDL Cholesterol 28   LDL Cholesterol  106 (A)   LDL/HDL Ratio 2.84   (A) Abnormal value             Lab Results   Component Value Date    TSH 2.190 03/22/2021    TSH 2.070 06/23/2020    TSH 2.310 12/19/2019      Lab Results   Component Value Date    FREET4 1.4 03/22/2021    FREET4 1.4 06/23/2020    FREET4 1.4 12/19/2019                          Visit Diagnoses:    ICD-10-CM ICD-9-CM   1. Vitamin D deficiency  E55.9 268.9   2. Class 2 obesity due to excess calories without serious comorbidity with body mass index (BMI) of 37.0 to 37.9 in adult  E66.09 278.00    Z68.37 V85.37   3. Anxiety, generalized  F41.1 300.02   4. Hypertension, essential  I10 401.9   5. Elevated liver enzymes  R74.8 790.5   6. Mixed hyperlipidemia  E78.2 272.2       Assessment and Plan   Diagnoses and all orders for this visit:    1. Vitamin D deficiency (Primary)  -     Comprehensive Metabolic Panel; Future  -     CBC & Differential;  Future  -     Lipid Panel; Future    2. Class 2 obesity due to excess calories without serious comorbidity with body mass index (BMI) of 37.0 to 37.9 in adult  -     Comprehensive Metabolic Panel; Future  -     CBC & Differential; Future  -     Lipid Panel; Future    3. Anxiety, generalized  -     Comprehensive Metabolic Panel; Future  -     CBC & Differential; Future  -     Lipid Panel; Future    4. Hypertension, essential  -     Comprehensive Metabolic Panel; Future  -     CBC & Differential; Future  -     Lipid Panel; Future    5. Elevated liver enzymes  -     Comprehensive Metabolic Panel; Future  -     CBC & Differential; Future  -     Lipid Panel; Future    6. Mixed hyperlipidemia  -     Comprehensive Metabolic Panel; Future  -     CBC & Differential; Future  -     Lipid Panel; Future      COVID infection September 21, 2022, resolved    OVERWGT--WGT LOSS DISCUSSED , from May 2022 till January 3, 2023,, has been using Saxenda with good results    Dyspnea/Orthopnea - echocardiogram showing some diastolic dysfunction LVH mild, normal ejection fraction, no significant valve abnnormalities, exercise treadmill test which showed no significant findings although her rate pressure product was low she was unable to achieve a heart rate response adequate enough, so a subsequent nuclear stress test was performed May 14 , 2021,-reported as normal,-Chest x-ray showed no active disease April 2021    Elevated liver enzymes, most likely due to nonalcoholic steatohepatitis, numbers have improved considerably as of August 30, 2022 with weight loss and dietary changes, ---------- ultrasound of the liver shows fatty liver, no lesions, hepatitis workup including hepatitis BC iron profile autoimmune hepatitis on negative May 2021,--rec WGT LOSS, AND STOP DICLOFENAC --RISK OF CIRROHSIS AND LIVER DAMAGE DISCUSSED WITH PT AND  AT [facility] MAY 19 , 2021 ---off diclofenac may 2022---    HTN----will decrease LISINOPRIL to 1/2 of 40  mg qd = 20 mg qd daily at bedtime, Tenoretic 50/25 mg every morning,     VIT D LEVEL,--continues on replacement,    Right total knee arthroplasty    Primary hyperparathyroidism --status post incomplete total parathyroidectomy 2009,    CAROL, Total BSO, rectocele repair, bladder repair surgery,  Previous breast biopsy,    Follow Up   Return in about 3 months (around 4/3/2023).  Patient was given instructions and counseling regarding her condition or for health maintenance advice. Please see specific information pulled into the AVS if appropriate.

## 2023-02-13 RX ORDER — LIRAGLUTIDE 6 MG/ML
INJECTION, SOLUTION SUBCUTANEOUS
Qty: 15 ML | Refills: 3 | Status: SHIPPED | OUTPATIENT
Start: 2023-02-13

## 2023-04-20 ENCOUNTER — LAB (OUTPATIENT)
Dept: LAB | Facility: HOSPITAL | Age: 63
End: 2023-04-20
Payer: COMMERCIAL

## 2023-04-20 DIAGNOSIS — F41.1 ANXIETY, GENERALIZED: ICD-10-CM

## 2023-04-20 DIAGNOSIS — R74.8 ELEVATED LIVER ENZYMES: ICD-10-CM

## 2023-04-20 DIAGNOSIS — E78.2 MIXED HYPERLIPIDEMIA: ICD-10-CM

## 2023-04-20 DIAGNOSIS — E55.9 VITAMIN D DEFICIENCY: ICD-10-CM

## 2023-04-20 DIAGNOSIS — I10 HYPERTENSION, ESSENTIAL: ICD-10-CM

## 2023-04-20 DIAGNOSIS — E66.09 CLASS 2 OBESITY DUE TO EXCESS CALORIES WITHOUT SERIOUS COMORBIDITY WITH BODY MASS INDEX (BMI) OF 37.0 TO 37.9 IN ADULT: ICD-10-CM

## 2023-04-20 LAB
ALBUMIN SERPL-MCNC: 4.1 G/DL (ref 3.5–5.2)
ALBUMIN/GLOB SERPL: 1.3 G/DL
ALP SERPL-CCNC: 68 U/L (ref 39–117)
ALT SERPL W P-5'-P-CCNC: 38 U/L (ref 1–33)
ANION GAP SERPL CALCULATED.3IONS-SCNC: 10.7 MMOL/L (ref 5–15)
AST SERPL-CCNC: 27 U/L (ref 1–32)
BASOPHILS # BLD AUTO: 0.07 10*3/MM3 (ref 0–0.2)
BASOPHILS NFR BLD AUTO: 1.1 % (ref 0–1.5)
BILIRUB SERPL-MCNC: 0.5 MG/DL (ref 0–1.2)
BUN SERPL-MCNC: 23 MG/DL (ref 8–23)
BUN/CREAT SERPL: 24 (ref 7–25)
CALCIUM SPEC-SCNC: 10 MG/DL (ref 8.6–10.5)
CHLORIDE SERPL-SCNC: 102 MMOL/L (ref 98–107)
CHOLEST SERPL-MCNC: 173 MG/DL (ref 0–200)
CO2 SERPL-SCNC: 30.3 MMOL/L (ref 22–29)
CREAT SERPL-MCNC: 0.96 MG/DL (ref 0.57–1)
DEPRECATED RDW RBC AUTO: 42.7 FL (ref 37–54)
EGFRCR SERPLBLD CKD-EPI 2021: 66.6 ML/MIN/1.73
EOSINOPHIL # BLD AUTO: 0.18 10*3/MM3 (ref 0–0.4)
EOSINOPHIL NFR BLD AUTO: 2.8 % (ref 0.3–6.2)
ERYTHROCYTE [DISTWIDTH] IN BLOOD BY AUTOMATED COUNT: 12.9 % (ref 12.3–15.4)
GLOBULIN UR ELPH-MCNC: 3.2 GM/DL
GLUCOSE SERPL-MCNC: 88 MG/DL (ref 65–99)
HCT VFR BLD AUTO: 41.8 % (ref 34–46.6)
HDLC SERPL-MCNC: 42 MG/DL (ref 40–60)
HGB BLD-MCNC: 13.9 G/DL (ref 12–15.9)
IMM GRANULOCYTES # BLD AUTO: 0.02 10*3/MM3 (ref 0–0.05)
IMM GRANULOCYTES NFR BLD AUTO: 0.3 % (ref 0–0.5)
LDLC SERPL CALC-MCNC: 106 MG/DL (ref 0–100)
LDLC/HDLC SERPL: 2.44 {RATIO}
LYMPHOCYTES # BLD AUTO: 2.64 10*3/MM3 (ref 0.7–3.1)
LYMPHOCYTES NFR BLD AUTO: 40.6 % (ref 19.6–45.3)
MCH RBC QN AUTO: 30.5 PG (ref 26.6–33)
MCHC RBC AUTO-ENTMCNC: 33.3 G/DL (ref 31.5–35.7)
MCV RBC AUTO: 91.7 FL (ref 79–97)
MONOCYTES # BLD AUTO: 0.64 10*3/MM3 (ref 0.1–0.9)
MONOCYTES NFR BLD AUTO: 9.8 % (ref 5–12)
NEUTROPHILS NFR BLD AUTO: 2.96 10*3/MM3 (ref 1.7–7)
NEUTROPHILS NFR BLD AUTO: 45.4 % (ref 42.7–76)
NRBC BLD AUTO-RTO: 0 /100 WBC (ref 0–0.2)
PLATELET # BLD AUTO: 292 10*3/MM3 (ref 140–450)
PMV BLD AUTO: 9.5 FL (ref 6–12)
POTASSIUM SERPL-SCNC: 4 MMOL/L (ref 3.5–5.2)
PROT SERPL-MCNC: 7.3 G/DL (ref 6–8.5)
RBC # BLD AUTO: 4.56 10*6/MM3 (ref 3.77–5.28)
SODIUM SERPL-SCNC: 143 MMOL/L (ref 136–145)
TRIGL SERPL-MCNC: 142 MG/DL (ref 0–150)
VLDLC SERPL-MCNC: 25 MG/DL (ref 5–40)
WBC NRBC COR # BLD: 6.51 10*3/MM3 (ref 3.4–10.8)

## 2023-04-20 PROCEDURE — 80053 COMPREHEN METABOLIC PANEL: CPT

## 2023-04-20 PROCEDURE — 80061 LIPID PANEL: CPT

## 2023-04-20 PROCEDURE — 85025 COMPLETE CBC W/AUTO DIFF WBC: CPT

## 2023-04-20 PROCEDURE — 36415 COLL VENOUS BLD VENIPUNCTURE: CPT

## 2023-04-21 ENCOUNTER — OFFICE VISIT (OUTPATIENT)
Dept: INTERNAL MEDICINE | Facility: CLINIC | Age: 63
End: 2023-04-21
Payer: COMMERCIAL

## 2023-04-21 VITALS
OXYGEN SATURATION: 93 % | WEIGHT: 240.4 LBS | HEIGHT: 69 IN | BODY MASS INDEX: 35.6 KG/M2 | HEART RATE: 74 BPM | DIASTOLIC BLOOD PRESSURE: 85 MMHG | TEMPERATURE: 96.8 F | SYSTOLIC BLOOD PRESSURE: 126 MMHG

## 2023-04-21 DIAGNOSIS — D17.9 LIPOMA, UNSPECIFIED SITE: ICD-10-CM

## 2023-04-21 DIAGNOSIS — F41.1 ANXIETY, GENERALIZED: ICD-10-CM

## 2023-04-21 DIAGNOSIS — E55.9 VITAMIN D DEFICIENCY: ICD-10-CM

## 2023-04-21 DIAGNOSIS — E78.2 MIXED HYPERLIPIDEMIA: Primary | ICD-10-CM

## 2023-04-21 DIAGNOSIS — I10 HYPERTENSION, ESSENTIAL: ICD-10-CM

## 2023-04-21 DIAGNOSIS — E66.09 CLASS 2 OBESITY DUE TO EXCESS CALORIES WITHOUT SERIOUS COMORBIDITY WITH BODY MASS INDEX (BMI) OF 37.0 TO 37.9 IN ADULT: ICD-10-CM

## 2023-04-21 DIAGNOSIS — Z12.31 SCREENING MAMMOGRAM FOR BREAST CANCER: ICD-10-CM

## 2023-04-21 DIAGNOSIS — R74.8 ELEVATED LIVER ENZYMES: ICD-10-CM

## 2023-04-21 RX ORDER — SEMAGLUTIDE 2.4 MG/.75ML
2.4 INJECTION, SOLUTION SUBCUTANEOUS WEEKLY
Qty: 2 ML | Refills: 5 | Status: SHIPPED | OUTPATIENT
Start: 2023-04-21

## 2023-04-21 NOTE — PROGRESS NOTES
"CHIEF COMPLAINT/ HPI:  Vitamin D Deficiency and Follow-up (Pt is here for a routine follow up )    follow-up with weight loss management blood pressure    Place on r arm and now painful , wants if out / off    Objective   Vital Signs  Vitals:    04/21/23 0928   BP: 126/85   Pulse: 74   Temp: 96.8 °F (36 °C)   SpO2: 93%   Weight: 109 kg (240 lb 6.4 oz)   Height: 175.3 cm (69.02\")      Body mass index is 35.48 kg/m².  Review of Systems   Constitutional: Negative.    HENT: Negative.    Eyes: Negative.    Respiratory: Negative.    Cardiovascular: Negative.    Gastrointestinal: Negative.    Endocrine: Negative.    Genitourinary: Negative.    Musculoskeletal: Negative.    Allergic/Immunologic: Negative.    Neurological: Negative.    Hematological: Negative.    Psychiatric/Behavioral: Negative.       Physical Exam  Constitutional:       General: She is not in acute distress.     Appearance: Normal appearance. She is obese.   HENT:      Head: Normocephalic.      Mouth/Throat:      Mouth: Mucous membranes are moist.   Eyes:      Conjunctiva/sclera: Conjunctivae normal.      Pupils: Pupils are equal, round, and reactive to light.   Cardiovascular:      Rate and Rhythm: Normal rate and regular rhythm.      Pulses: Normal pulses.      Heart sounds: Normal heart sounds.   Pulmonary:      Effort: Pulmonary effort is normal.      Breath sounds: Normal breath sounds.   Abdominal:      General: Bowel sounds are normal.      Palpations: Abdomen is soft.   Musculoskeletal:         General: No swelling. Normal range of motion.      Cervical back: Neck supple.   Skin:     General: Skin is warm and dry.      Coloration: Skin is not jaundiced.   Neurological:      General: No focal deficit present.      Mental Status: She is alert and oriented to person, place, and time. Mental status is at baseline.   Psychiatric:         Mood and Affect: Mood normal.         Behavior: Behavior normal.         Thought Content: Thought content normal.    "      Judgment: Judgment normal.     Right triceps right lateral arm subcutaneous nodularity smooth slightly mobile, mildly tender, no redness, no ulcerations,  Result Review :   Lab Results   Component Value Date     03/22/2021     CMP        8/30/2022    09:36 4/20/2023    10:20   CMP   Glucose 94   88     BUN 22   23     Creatinine 0.96   0.96     EGFR 67.0   66.6     Sodium 142   143     Potassium 3.7   4.0     Chloride 102   102     Calcium 9.3   10.0     Total Protein 7.0   7.3     Albumin 4.20   4.1     Globulin 2.8   3.2     Total Bilirubin 0.6   0.5     Alkaline Phosphatase 58   68     AST (SGOT) 33   27     ALT (SGPT) 41   38     Albumin/Globulin Ratio 1.5   1.3     BUN/Creatinine Ratio 22.9   24.0     Anion Gap 12.3   10.7       CBC w/diff        8/30/2022    09:36 4/20/2023    10:20   CBC w/Diff   WBC 7.23   6.51     RBC 4.49   4.56     Hemoglobin 13.8   13.9     Hematocrit 40.2   41.8     MCV 89.5   91.7     MCH 30.7   30.5     MCHC 34.3   33.3     RDW 12.7   12.9     Platelets 287   292     Neutrophil Rel % 50.8   45.4     Immature Granulocyte Rel % 0.4   0.3     Lymphocyte Rel % 37.2   40.6     Monocyte Rel % 8.0   9.8     Eosinophil Rel % 2.6   2.8     Basophil Rel % 1.0   1.1        Lipid Panel        8/30/2022    09:36 4/20/2023    10:20   Lipid Panel   Total Cholesterol 170   173     Triglycerides 159   142     HDL Cholesterol 36   42     VLDL Cholesterol 28   25     LDL Cholesterol  106   106     LDL/HDL Ratio 2.84   2.44        Lab Results   Component Value Date    TSH 2.190 03/22/2021    TSH 2.070 06/23/2020    TSH 2.310 12/19/2019      Lab Results   Component Value Date    FREET4 1.4 03/22/2021    FREET4 1.4 06/23/2020    FREET4 1.4 12/19/2019                          Visit Diagnoses:    ICD-10-CM ICD-9-CM   1. Mixed hyperlipidemia  E78.2 272.2   2. Hypertension, essential  I10 401.9   3. Anxiety, generalized  F41.1 300.02   4. Elevated liver enzymes  R74.8 790.5   5. Class 2 obesity due  to excess calories without serious comorbidity with body mass index (BMI) of 37.0 to 37.9 in adult  E66.09 278.00    Z68.37 V85.37   6. Vitamin D deficiency  E55.9 268.9   7. Screening mammogram for breast cancer  Z12.31 V76.12       Assessment and Plan   Diagnoses and all orders for this visit:    1. Mixed hyperlipidemia (Primary)  -     Comprehensive Metabolic Panel; Future  -     Mammo Screening Digital Tomosynthesis Bilateral With CAD; Future    2. Hypertension, essential  -     Comprehensive Metabolic Panel; Future  -     Mammo Screening Digital Tomosynthesis Bilateral With CAD; Future    3. Anxiety, generalized  -     Comprehensive Metabolic Panel; Future  -     Mammo Screening Digital Tomosynthesis Bilateral With CAD; Future    4. Elevated liver enzymes  -     Comprehensive Metabolic Panel; Future  -     Mammo Screening Digital Tomosynthesis Bilateral With CAD; Future    5. Class 2 obesity due to excess calories without serious comorbidity with body mass index (BMI) of 37.0 to 37.9 in adult  -     Comprehensive Metabolic Panel; Future  -     Mammo Screening Digital Tomosynthesis Bilateral With CAD; Future    6. Vitamin D deficiency  -     Comprehensive Metabolic Panel; Future  -     Mammo Screening Digital Tomosynthesis Bilateral With CAD; Future    7. Screening mammogram for breast cancer  -     Mammo Screening Digital Tomosynthesis Bilateral With CAD; Future    Other orders  -     Semaglutide-Weight Management (Wegovy) 2.4 MG/0.75ML solution auto-injector; Inject 2.4 mg under the skin into the appropriate area as directed 1 (One) Time Per Week.  Dispense: 2 mL; Refill: 5          Right arm triceps subcutaneous lesion discussed treatment options April 2023----we will make referral to general surgery for second opinion on treatment options surgery etc.,    COVID infection September 21, 2022, resolved    OVERWGT--WGT LOSS DISCUSSED , from May 2022 till January 3, 2023,, has been using Saxenda with good  results---will change to wegovy April 21, 2023---    Dyspnea/Orthopnea - echocardiogram showing some diastolic dysfunction LVH mild, normal ejection fraction, no significant valve abnnormalities, exercise treadmill test which showed no significant findings although her rate pressure product was low she was unable to achieve a heart rate response adequate enough, so a subsequent nuclear stress test was performed May 14 , 2021,-----reported as normal,-----Chest x-ray showed no active disease April 2021    Elevated liver enzymes, most likely due to nonalcoholic steatohepatitis, numbers have improved considerably as of August 30, 2022 with weight loss and dietary changes, ---------- ultrasound of the liver shows fatty liver, no lesions, hepatitis workup including hepatitis B, C , iron profile autoimmune hepatitis on negative May 2021,--rec WGT LOSS, AND STOP DICLOFENAC --RISK OF CIRROHSIS AND LIVER DAMAGE [facility] ---off diclofenac may 2022---    HTN---cont  LISINOPRIL  20 mg qd daily at bedtime, Tenoretic 50/25 mg every morning,     VIT D LEVEL,--continues on replacement,    Right total knee arthroplasty    Primary hyperparathyroidism --status post incomplete total parathyroidectomy 2009,    CAROL, Total BSO, rectocele repair, bladder repair surgery,  Previous breast biopsy,      Follow Up   Return in about 6 months (around 10/21/2023).  Patient was given instructions and counseling regarding her condition or for health maintenance advice. Please see specific information pulled into the AVS if appropriate.

## 2023-04-24 ENCOUNTER — TELEPHONE (OUTPATIENT)
Dept: INTERNAL MEDICINE | Facility: CLINIC | Age: 63
End: 2023-04-24
Payer: COMMERCIAL

## 2023-04-28 ENCOUNTER — HOSPITAL ENCOUNTER (OUTPATIENT)
Dept: MAMMOGRAPHY | Facility: HOSPITAL | Age: 63
Discharge: HOME | End: 2023-04-28
Admitting: INTERNAL MEDICINE
Payer: COMMERCIAL

## 2023-04-28 DIAGNOSIS — R74.8 ELEVATED LIVER ENZYMES: ICD-10-CM

## 2023-04-28 DIAGNOSIS — E78.2 MIXED HYPERLIPIDEMIA: ICD-10-CM

## 2023-04-28 DIAGNOSIS — Z12.31 SCREENING MAMMOGRAM FOR BREAST CANCER: ICD-10-CM

## 2023-04-28 DIAGNOSIS — E66.09 CLASS 2 OBESITY DUE TO EXCESS CALORIES WITHOUT SERIOUS COMORBIDITY WITH BODY MASS INDEX (BMI) OF 37.0 TO 37.9 IN ADULT: ICD-10-CM

## 2023-04-28 DIAGNOSIS — F41.1 ANXIETY, GENERALIZED: ICD-10-CM

## 2023-04-28 DIAGNOSIS — I10 HYPERTENSION, ESSENTIAL: ICD-10-CM

## 2023-04-28 DIAGNOSIS — E55.9 VITAMIN D DEFICIENCY: ICD-10-CM

## 2023-04-28 PROCEDURE — 77063 BREAST TOMOSYNTHESIS BI: CPT

## 2023-04-28 PROCEDURE — 77067 SCR MAMMO BI INCL CAD: CPT

## 2023-09-25 RX ORDER — LISINOPRIL 40 MG/1
TABLET ORAL
Qty: 90 TABLET | Refills: 3 | Status: SHIPPED | OUTPATIENT
Start: 2023-09-25

## 2023-11-29 ENCOUNTER — TELEPHONE (OUTPATIENT)
Dept: INTERNAL MEDICINE | Facility: CLINIC | Age: 63
End: 2023-11-29
Payer: COMMERCIAL

## 2023-12-20 RX ORDER — ATENOLOL AND CHLORTHALIDONE TABLET 50; 25 MG/1; MG/1
TABLET ORAL
Qty: 90 TABLET | Refills: 3 | Status: SHIPPED | OUTPATIENT
Start: 2023-12-20

## 2024-01-03 ENCOUNTER — OFFICE VISIT (OUTPATIENT)
Dept: INTERNAL MEDICINE | Facility: CLINIC | Age: 64
End: 2024-01-03
Payer: COMMERCIAL

## 2024-01-03 VITALS
BODY MASS INDEX: 38.21 KG/M2 | OXYGEN SATURATION: 94 % | DIASTOLIC BLOOD PRESSURE: 81 MMHG | TEMPERATURE: 98.2 F | SYSTOLIC BLOOD PRESSURE: 121 MMHG | HEIGHT: 69 IN | WEIGHT: 258 LBS | HEART RATE: 67 BPM

## 2024-01-03 DIAGNOSIS — Z12.31 SCREENING MAMMOGRAM FOR BREAST CANCER: ICD-10-CM

## 2024-01-03 DIAGNOSIS — E66.09 CLASS 2 OBESITY DUE TO EXCESS CALORIES WITHOUT SERIOUS COMORBIDITY WITH BODY MASS INDEX (BMI) OF 37.0 TO 37.9 IN ADULT: ICD-10-CM

## 2024-01-03 DIAGNOSIS — R74.8 ELEVATED LIVER ENZYMES: ICD-10-CM

## 2024-01-03 DIAGNOSIS — I10 HYPERTENSION, ESSENTIAL: ICD-10-CM

## 2024-01-03 DIAGNOSIS — R60.0 BILATERAL LEG EDEMA: ICD-10-CM

## 2024-01-03 DIAGNOSIS — Z00.00 PHYSICAL EXAM, ANNUAL: Primary | ICD-10-CM

## 2024-01-03 DIAGNOSIS — F41.1 ANXIETY, GENERALIZED: ICD-10-CM

## 2024-01-03 DIAGNOSIS — E78.2 MIXED HYPERLIPIDEMIA: ICD-10-CM

## 2024-01-03 DIAGNOSIS — E55.9 VITAMIN D DEFICIENCY: ICD-10-CM

## 2024-01-03 NOTE — PROGRESS NOTES
"CHIEF COMPLAINT/ HPI:----here for annual exam, --- we discussed preventitive measures , wears seat belts , encouraged exercise, she does not smoke or drink     Hyperlipidemia (Routine follow up, Covid vaccine request. Wants to discuss Wegovy has stopped taking it months ago. )              Objective   Vital Signs  Vitals:    01/03/24 1203   BP: 121/81   Pulse: 67   Temp: 98.2 °F (36.8 °C)   SpO2: 94%   Weight: 117 kg (258 lb)   Height: 175.3 cm (69.02\")      Body mass index is 38.08 kg/m².  Review of Systems   Constitutional: Negative.    HENT: Negative.     Eyes:  Positive for blurred vision.   Respiratory: Negative.  Negative for shortness of breath.    Cardiovascular: Negative.  Negative for chest pain and palpitations.   Gastrointestinal: Negative.    Endocrine: Negative.    Genitourinary: Negative.    Musculoskeletal: Negative.    Allergic/Immunologic: Negative.    Neurological: Negative.    Hematological: Negative.    Psychiatric/Behavioral: Negative.        Physical Exam  Constitutional:       General: She is not in acute distress.     Appearance: Normal appearance. She is obese.   HENT:      Head: Normocephalic.      Mouth/Throat:      Mouth: Mucous membranes are moist.   Eyes:      Conjunctiva/sclera: Conjunctivae normal.      Pupils: Pupils are equal, round, and reactive to light.   Cardiovascular:      Rate and Rhythm: Normal rate and regular rhythm.      Pulses: Normal pulses.      Heart sounds: Normal heart sounds.   Pulmonary:      Effort: Pulmonary effort is normal.      Breath sounds: Normal breath sounds.   Abdominal:      General: Bowel sounds are normal.      Palpations: Abdomen is soft.   Musculoskeletal:         General: No swelling. Normal range of motion.      Cervical back: Neck supple.   Skin:     General: Skin is warm and dry.      Coloration: Skin is not jaundiced.   Neurological:      General: No focal deficit present.      Mental Status: She is alert and oriented to person, place, and " time. Mental status is at baseline.   Psychiatric:         Mood and Affect: Mood normal.         Behavior: Behavior normal.         Thought Content: Thought content normal.         Judgment: Judgment normal.        Result Review :   Lab Results   Component Value Date     03/22/2021     CMP          4/20/2023    10:20   CMP   Glucose 88    BUN 23    Creatinine 0.96    EGFR 66.6    Sodium 143    Potassium 4.0    Chloride 102    Calcium 10.0    Total Protein 7.3    Albumin 4.1    Globulin 3.2    Total Bilirubin 0.5    Alkaline Phosphatase 68    AST (SGOT) 27    ALT (SGPT) 38    Albumin/Globulin Ratio 1.3    BUN/Creatinine Ratio 24.0    Anion Gap 10.7      CBC w/diff          4/20/2023    10:20   CBC w/Diff   WBC 6.51    RBC 4.56    Hemoglobin 13.9    Hematocrit 41.8    MCV 91.7    MCH 30.5    MCHC 33.3    RDW 12.9    Platelets 292    Neutrophil Rel % 45.4    Immature Granulocyte Rel % 0.3    Lymphocyte Rel % 40.6    Monocyte Rel % 9.8    Eosinophil Rel % 2.8    Basophil Rel % 1.1       Lipid Panel          4/20/2023    10:20   Lipid Panel   Total Cholesterol 173    Triglycerides 142    HDL Cholesterol 42    VLDL Cholesterol 25    LDL Cholesterol  106    LDL/HDL Ratio 2.44       Lab Results   Component Value Date    TSH 2.190 03/22/2021    TSH 2.070 06/23/2020    TSH 2.310 12/19/2019      Lab Results   Component Value Date    FREET4 1.4 03/22/2021    FREET4 1.4 06/23/2020    FREET4 1.4 12/19/2019                          Visit Diagnoses:    ICD-10-CM ICD-9-CM   1. Physical exam, annual  Z00.00 V70.0   2. Screening mammogram for breast cancer  Z12.31 V76.12   3. Anxiety, generalized  F41.1 300.02   4. Vitamin D deficiency  E55.9 268.9   5. Hypertension, essential  I10 401.9   6. Class 2 obesity due to excess calories without serious comorbidity with body mass index (BMI) of 37.0 to 37.9 in adult  E66.09 278.00    Z68.37 V85.37   7. Elevated liver enzymes  R74.8 790.5   8. Mixed hyperlipidemia  E78.2 272.2   9.  Bilateral leg edema  R60.0 782.3       Assessment and Plan   Diagnoses and all orders for this visit:    1. Physical exam, annual (Primary)  -     Comprehensive Metabolic Panel; Future  -     CBC & Differential; Future  -     Lipid Panel; Future  -     Vitamin D,25-Hydroxy; Future  -     TSH+Free T4; Future    2. Screening mammogram for breast cancer  -     Comprehensive Metabolic Panel; Future  -     CBC & Differential; Future  -     Lipid Panel; Future  -     Vitamin D,25-Hydroxy; Future  -     TSH+Free T4; Future    3. Anxiety, generalized  -     Comprehensive Metabolic Panel; Future  -     CBC & Differential; Future  -     Lipid Panel; Future  -     Vitamin D,25-Hydroxy; Future  -     TSH+Free T4; Future    4. Vitamin D deficiency  -     Comprehensive Metabolic Panel; Future  -     CBC & Differential; Future  -     Lipid Panel; Future  -     Vitamin D,25-Hydroxy; Future  -     TSH+Free T4; Future    5. Hypertension, essential  -     Comprehensive Metabolic Panel; Future  -     CBC & Differential; Future  -     Lipid Panel; Future  -     Vitamin D,25-Hydroxy; Future  -     TSH+Free T4; Future    6. Class 2 obesity due to excess calories without serious comorbidity with body mass index (BMI) of 37.0 to 37.9 in adult  -     Comprehensive Metabolic Panel; Future  -     CBC & Differential; Future  -     Lipid Panel; Future  -     Vitamin D,25-Hydroxy; Future  -     TSH+Free T4; Future    7. Elevated liver enzymes  -     Comprehensive Metabolic Panel; Future  -     CBC & Differential; Future  -     Lipid Panel; Future  -     Vitamin D,25-Hydroxy; Future  -     TSH+Free T4; Future    8. Mixed hyperlipidemia  -     Comprehensive Metabolic Panel; Future  -     CBC & Differential; Future  -     Lipid Panel; Future  -     Vitamin D,25-Hydroxy; Future  -     TSH+Free T4; Future    9. Bilateral leg edema  -     Comprehensive Metabolic Panel; Future  -     CBC & Differential; Future  -     Lipid Panel; Future  -     Vitamin  D,25-Hydroxy; Future  -     TSH+Free T4; Future    Other orders  -     Tirzepatide-Weight Management (ZEPBOUND) 5 MG/0.5ML solution auto-injector; Inject 0.5 mL under the skin into the appropriate area as directed 1 (One) Time Per Week.  Dispense: 2 mL; Refill: 5    OVERWGT--WGT LOSS DISCUSSED , from May 2022 till January 3, 2023,, has been using Saxenda with good results---will change to wegovy April 21, 2023--patient stopped the Wegovy, she had some concerns and friends told her some side effect issues, so she stopped it for the past he did well with that initially, will restart Zepp bound-as a new prescription weekly starting January 3, 2024, she will follow back up in 4 months with lab work, she did well with that initially,      Dyspnea/Orthopnea - echocardiogram showing some diastolic dysfunction LVH mild, normal ejection fraction, no significant 3 to 4 months, valve abnnormalities, exercise treadmill test which showed no significant findings although her rate pressure product was low she was unable to achieve a heart rate response adequate enough, so a subsequent nuclear stress test was performed May 14 , 2021,-----reported as normal,-----Chest x-ray showed no active disease April 2021     Elevated liver enzymes, most likely due to nonalcoholic steatohepatitis, numbers have improved considerably as of August 30, 2022 with weight loss and dietary changes, ---------- ultrasound of the liver shows fatty liver, no lesions, hepatitis workup including hepatitis B, C , iron profile autoimmune hepatitis on negative May 2021,--rec WGT LOSS, AND STOP DICLOFENAC --RISK OF CIRROHSIS AND LIVER DAMAGE DISCUSSED WITH PT AND  AT [facility] MAY 19 , 2021 ---off diclofenac may 2022---     HTN---cont  LISINOPRIL  20 mg qd daily at bedtime, Tenoretic 50/25 mg every morning,      VIT D LEVEL,--continues on replacement,     Right total knee arthroplasty     Primary hyperparathyroidism --status post incomplete total  parathyroidectomy 2009,     CAROL, Total BSO, rectocele repair, bladder repair surgery,  Previous breast biopsy,        Follow Up   Return in about 4 months (around 5/3/2024).  Patient was given instructions and counseling regarding her condition or for health maintenance advice. Please see specific information pulled into the AVS if appropriate.         Answers submitted by the patient for this visit:  Primary Reason for Visit (Submitted on 12/27/2023)  What is the primary reason for your visit?: High Blood Pressure  High Blood Pressure Questionnaire (Submitted on 12/27/2023)  Chief Complaint: Hypertension  Chronicity: recurrent  Onset: more than 1 year ago  Progression since onset: unchanged  anxiety: No  headaches: Yes  orthopnea: No  peripheral edema: Yes  Compliance problems: no compliance problems

## 2024-01-23 ENCOUNTER — TELEPHONE (OUTPATIENT)
Dept: INTERNAL MEDICINE | Facility: CLINIC | Age: 64
End: 2024-01-23
Payer: COMMERCIAL

## 2024-01-23 NOTE — TELEPHONE ENCOUNTER
Caller: Keke Ureña    Relationship to patient: Self    Best call back number: [phone]    Patient is needing: PATIENT CALLED STATING HER INSURANCE WILL NOT COVER THE ZEPBOUND PRESCRIPTION THAT MD MANNING HAD PRESCRIBED HER. PATIENT STATES THE PHARMACY TOLD HER THAT HER INSURANCE WILL COVER WEGOVY AND PATIENT IS WANTING TO KNOW IF SHE COULD RESTART WEGOVY AGAIN. PATIENT STATES SHE USES [facility].            Plan: Will discontinue phototherapy and will continue with topicals. States she will wait for pt to turn 11 to see if opezulera would be a option Detail Level: Zone Continue Regimen: tacrolimus 0.03 % topical ointment BID\\nSig: Apply to white spots on BID\\n\\nclobetasol 0.05 % scalp solution \\nSig: Apply BID to white spots on scalp. One week on and one week off

## 2024-06-14 ENCOUNTER — TRANSCRIBE ORDERS (OUTPATIENT)
Dept: ADMINISTRATIVE | Facility: HOSPITAL | Age: 64
End: 2024-06-14
Payer: COMMERCIAL

## 2024-06-14 DIAGNOSIS — Z12.31 VISIT FOR SCREENING MAMMOGRAM: Primary | ICD-10-CM

## 2024-06-20 ENCOUNTER — HOSPITAL ENCOUNTER (OUTPATIENT)
Dept: MAMMOGRAPHY | Facility: HOSPITAL | Age: 64
Discharge: HOME | End: 2024-06-20
Admitting: INTERNAL MEDICINE
Payer: COMMERCIAL

## 2024-06-20 DIAGNOSIS — Z12.31 VISIT FOR SCREENING MAMMOGRAM: ICD-10-CM

## 2024-06-20 PROCEDURE — 77063 BREAST TOMOSYNTHESIS BI: CPT

## 2024-06-20 PROCEDURE — 77067 SCR MAMMO BI INCL CAD: CPT

## 2024-09-06 ENCOUNTER — TELEPHONE (OUTPATIENT)
Dept: INTERNAL MEDICINE | Age: 64
End: 2024-09-06
Payer: COMMERCIAL

## 2024-09-06 NOTE — TELEPHONE ENCOUNTER
----- Message from Beba DE LA PAZ sent at 9/6/2024  1:56 PM EDT -----  Regarding: SERAFIN: Wegovy   Contact: [phone]        ----- Message -----  From: Keke Ureña  Sent: 9/3/2024   3:51 PM EDT  To: [facility]  Subject: Wegovy                                           You prescribed this for me at the beginning of the year. At that time [facility] could not fill it because it was in short supply. I tried again months later but still no success. I was wondering if you could send in another prescription to [facility] maybe by now the supply chain has caught up.

## 2024-10-07 RX ORDER — LISINOPRIL 40 MG/1
40 TABLET ORAL DAILY
Qty: 90 TABLET | Refills: 3 | Status: SHIPPED | OUTPATIENT
Start: 2024-10-07

## 2024-12-11 RX ORDER — ATENOLOL AND CHLORTHALIDONE TABLET 50; 25 MG/1; MG/1
1 TABLET ORAL DAILY
Qty: 90 TABLET | Refills: 3 | OUTPATIENT
Start: 2024-12-11

## 2025-01-10 ENCOUNTER — OFFICE VISIT (OUTPATIENT)
Dept: ORTHOPEDIC SURGERY | Facility: CLINIC | Age: 65
End: 2025-01-10
Payer: MEDICARE

## 2025-01-10 VITALS
BODY MASS INDEX: 38.21 KG/M2 | OXYGEN SATURATION: 93 % | HEIGHT: 69 IN | DIASTOLIC BLOOD PRESSURE: 77 MMHG | HEART RATE: 67 BPM | SYSTOLIC BLOOD PRESSURE: 117 MMHG | WEIGHT: 258 LBS

## 2025-01-10 DIAGNOSIS — M25.551 RIGHT HIP PAIN: Primary | ICD-10-CM

## 2025-01-10 DIAGNOSIS — M16.11 PRIMARY OSTEOARTHRITIS OF RIGHT HIP: ICD-10-CM

## 2025-01-10 RX ORDER — DICLOFENAC SODIUM 75 MG/1
75 TABLET, DELAYED RELEASE ORAL 2 TIMES DAILY
Qty: 60 TABLET | Refills: 1 | Status: SHIPPED | OUTPATIENT
Start: 2025-01-10

## 2025-01-10 NOTE — PROGRESS NOTES
"Chief Complaint  Initial Evaluation of the Right Hip       Subjective      Keke Ureña presents to [facility] for an evaluation  of her right hip. Her right hip has been bothering her for several months. She has pain with prolonged walking and standing. She locates her pan to the groin region. She has been doing home exercises without much relief.      No Known Allergies     Social History     Socioeconomic History    Marital status:    Tobacco Use    Smoking status: Never    Smokeless tobacco: Never   Vaping Use    Vaping status: Never Used   Substance and Sexual Activity    Alcohol use: Never    Drug use: Never    Sexual activity: Defer        I reviewed the patient's chief complaint, history of present illness, review of systems, past medical history, surgical history, family history, social history, medications, and allergy list.     Review of Systems     Constitutional: Denies fevers, chills, weight loss  Cardiovascular: Denies chest pain, shortness of breath  Skin: Denies rashes, acute skin changes  Neurologic: Denies headache, loss of consciousness  MSK: Right hip pain       Vital Signs:   /77   Pulse 67   Ht 175.3 cm (69.02\")   Wt 117 kg (258 lb)   SpO2 93%   BMI 38.08 kg/m²            Ortho Exam    Physical Exam  General:Alert. No acute distress     Right lower extremity: hip flexion to 80 degrees, external rotation  to 30 degrees, internal rotation to 25 degrees, negative  straight leg raise, equal leg lengths, non tender to the lateral  hip, distal neurovascularly intact, calf soft, positive  pulses, positive EHL, FHL, GS, and TA. Sensation intact to all 5 nerves of the foot.     Procedures    X-Ray Report:  Right hip X-Ray  Indication: Evaluation of right hip pain   AP/Lateral view(s)  Findings: moderate degenerative changes, no acute fracture   Prior studies available for comparison: no       Imaging Results (Most Recent)       Procedure Component " Value Units Date/Time    XR Hip With or Without Pelvis 2 - 3 View Right [[id number]] Resulted: 01/10/25 0814     Updated: 01/10/25 0826             Result Review :       No results found.           Assessment and Plan     Diagnoses and all orders for this visit:    1. Right hip pain (Primary)  -     XR Hip With or Without Pelvis 2 - 3 View Right    2. Primary osteoarthritis of right hip      The patient presents here today for an evaluation  of her right hip. X-rays were obtained in the office today and these were reviewed today.     Discussed placing an order for a right hip intra articular steroid injection by radiology in the future if symptoms persist.     Order placed today for physical therapy and Diclofenac sent into the pharmacy.     Call or return if worsening symptoms.    Follow Up     6 - 8 weeks     Patient was given instructions and counseling regarding her condition or for health maintenance advice. Please see specific information pulled into the AVS if appropriate.     Scribed for Charles Johnson MD by Alesha Boudreaux.  01/10/25   08:40 EST    I have personally performed the services described in this document as scribed by the above individual and it is both accurate and complete. Charles Johnson MD 01/12/25

## 2025-01-15 ENCOUNTER — OFFICE VISIT (OUTPATIENT)
Dept: INTERNAL MEDICINE | Age: 65
End: 2025-01-15
Payer: MEDICARE

## 2025-01-15 VITALS
HEIGHT: 69 IN | HEART RATE: 86 BPM | BODY MASS INDEX: 40.58 KG/M2 | SYSTOLIC BLOOD PRESSURE: 142 MMHG | TEMPERATURE: 98.1 F | WEIGHT: 274 LBS | DIASTOLIC BLOOD PRESSURE: 86 MMHG | OXYGEN SATURATION: 96 %

## 2025-01-15 DIAGNOSIS — I10 HYPERTENSION, ESSENTIAL: ICD-10-CM

## 2025-01-15 DIAGNOSIS — E78.2 MIXED HYPERLIPIDEMIA: ICD-10-CM

## 2025-01-15 DIAGNOSIS — E55.9 VITAMIN D DEFICIENCY: ICD-10-CM

## 2025-01-15 DIAGNOSIS — Z12.31 SCREENING MAMMOGRAM FOR BREAST CANCER: ICD-10-CM

## 2025-01-15 DIAGNOSIS — F41.1 ANXIETY, GENERALIZED: ICD-10-CM

## 2025-01-15 DIAGNOSIS — R60.0 BILATERAL LEG EDEMA: ICD-10-CM

## 2025-01-15 DIAGNOSIS — R74.8 ELEVATED LIVER ENZYMES: ICD-10-CM

## 2025-01-15 DIAGNOSIS — E66.09 CLASS 2 OBESITY DUE TO EXCESS CALORIES WITHOUT SERIOUS COMORBIDITY WITH BODY MASS INDEX (BMI) OF 37.0 TO 37.9 IN ADULT: ICD-10-CM

## 2025-01-15 DIAGNOSIS — E66.812 CLASS 2 OBESITY DUE TO EXCESS CALORIES WITHOUT SERIOUS COMORBIDITY WITH BODY MASS INDEX (BMI) OF 37.0 TO 37.9 IN ADULT: ICD-10-CM

## 2025-01-15 DIAGNOSIS — Z00.00 WELCOME TO MEDICARE PREVENTIVE VISIT: Primary | ICD-10-CM

## 2025-01-15 PROCEDURE — G0402 INITIAL PREVENTIVE EXAM: HCPCS | Performed by: INTERNAL MEDICINE

## 2025-01-15 PROCEDURE — 1126F AMNT PAIN NOTED NONE PRSNT: CPT | Performed by: INTERNAL MEDICINE

## 2025-01-15 PROCEDURE — 1170F FXNL STATUS ASSESSED: CPT | Performed by: INTERNAL MEDICINE

## 2025-01-15 PROCEDURE — 96160 PT-FOCUSED HLTH RISK ASSMT: CPT | Performed by: INTERNAL MEDICINE

## 2025-01-15 PROCEDURE — 3079F DIAST BP 80-89 MM HG: CPT | Performed by: INTERNAL MEDICINE

## 2025-01-15 PROCEDURE — 3077F SYST BP >= 140 MM HG: CPT | Performed by: INTERNAL MEDICINE

## 2025-01-15 NOTE — PROGRESS NOTES
"CHIEF COMPLAINT/ HPI:  Welcome To Medicare (Wellness visit, pt is non fasting for labs. Medication Refills. Pt would like to discuss weight loss, pt has been on Wegovy and now insurance does not cover it. Asking for alternative. )              Objective   Vital Signs  Vitals:    01/15/25 0952   BP: 142/86   BP Location: Left arm   Patient Position: Sitting   Pulse: 86   Temp: 98.1 °F (36.7 °C)   SpO2: 96%   Weight: 124 kg (274 lb)   Height: 175.3 cm (69.02\")      Body mass index is 40.44 kg/m².  Review of Systems   Eyes:  Negative for blurred vision.   Respiratory:  Negative for shortness of breath.    Cardiovascular:  Negative for chest pain and palpitations.      Physical Exam  Constitutional:       General: She is not in acute distress.     Appearance: Normal appearance. She is obese.   HENT:      Head: Normocephalic.      Mouth/Throat:      Mouth: Mucous membranes are moist.   Eyes:      Conjunctiva/sclera: Conjunctivae normal.      Pupils: Pupils are equal, round, and reactive to light.   Cardiovascular:      Rate and Rhythm: Normal rate and regular rhythm.      Pulses: Normal pulses.      Heart sounds: Normal heart sounds.   Pulmonary:      Effort: Pulmonary effort is normal.      Breath sounds: Normal breath sounds.   Abdominal:      General: Bowel sounds are normal.      Palpations: Abdomen is soft.   Musculoskeletal:         General: No swelling. Normal range of motion.      Cervical back: Neck supple.   Skin:     General: Skin is warm and dry.      Coloration: Skin is not jaundiced.   Neurological:      General: No focal deficit present.      Mental Status: She is alert and oriented to person, place, and time. Mental status is at baseline.   Psychiatric:         Mood and Affect: Mood normal.         Behavior: Behavior normal.         Thought Content: Thought content normal.         Judgment: Judgment normal.        Result Review :   Lab Results   Component Value Date     03/22/2021             Lab " Results   Component Value Date    TSH 2.190 03/22/2021    TSH 2.070 06/23/2020    TSH 2.310 12/19/2019      Lab Results   Component Value Date    FREET4 1.4 03/22/2021    FREET4 1.4 06/23/2020    FREET4 1.4 12/19/2019                          Visit Diagnoses:    ICD-10-CM ICD-9-CM   1. Welcome to Medicare preventive visit  Z00.00 V70.0   2. Class 2 obesity due to excess calories without serious comorbidity with body mass index (BMI) of 37.0 to 37.9 in adult  E66.812 278.00    E66.09 V85.37    Z68.37    3. Anxiety, generalized  F41.1 300.02   4. Screening mammogram for breast cancer  Z12.31 V76.12   5. Vitamin D deficiency  E55.9 268.9   6. Hypertension, essential  I10 401.9   7. Mixed hyperlipidemia  E78.2 272.2   8. Elevated liver enzymes  R74.8 790.5   9. Bilateral leg edema  R60.0 782.3       Assessment and Plan   Diagnoses and all orders for this visit:    1. Welcome to Medicare preventive visit (Primary)  -     CBC & Differential; Future  -     Comprehensive Metabolic Panel; Future  -     Lipid Panel; Future  -     Vitamin D,25-Hydroxy; Future  -     Vitamin B12 anemia; Future  -     Folate anemia; Future  -     TSH+Free T4; Future    2. Class 2 obesity due to excess calories without serious comorbidity with body mass index (BMI) of 37.0 to 37.9 in adult  -     CBC & Differential; Future  -     Comprehensive Metabolic Panel; Future  -     Lipid Panel; Future  -     Vitamin D,25-Hydroxy; Future  -     Vitamin B12 anemia; Future  -     Folate anemia; Future  -     TSH+Free T4; Future    3. Anxiety, generalized  -     CBC & Differential; Future  -     Comprehensive Metabolic Panel; Future  -     Lipid Panel; Future  -     Vitamin D,25-Hydroxy; Future  -     Vitamin B12 anemia; Future  -     Folate anemia; Future  -     TSH+Free T4; Future    4. Screening mammogram for breast cancer  -     CBC & Differential; Future  -     Comprehensive Metabolic Panel; Future  -     Lipid Panel; Future  -     Vitamin D,25-Hydroxy;  Future  -     Vitamin B12 anemia; Future  -     Folate anemia; Future  -     TSH+Free T4; Future    5. Vitamin D deficiency  -     CBC & Differential; Future  -     Comprehensive Metabolic Panel; Future  -     Lipid Panel; Future  -     Vitamin D,25-Hydroxy; Future  -     Vitamin B12 anemia; Future  -     Folate anemia; Future  -     TSH+Free T4; Future    6. Hypertension, essential  -     CBC & Differential; Future  -     Comprehensive Metabolic Panel; Future  -     Lipid Panel; Future  -     Vitamin D,25-Hydroxy; Future  -     Vitamin B12 anemia; Future  -     Folate anemia; Future  -     TSH+Free T4; Future    7. Mixed hyperlipidemia  -     CBC & Differential; Future  -     Comprehensive Metabolic Panel; Future  -     Lipid Panel; Future  -     Vitamin D,25-Hydroxy; Future  -     Vitamin B12 anemia; Future  -     Folate anemia; Future  -     TSH+Free T4; Future    8. Elevated liver enzymes  -     CBC & Differential; Future  -     Comprehensive Metabolic Panel; Future  -     Lipid Panel; Future  -     Vitamin D,25-Hydroxy; Future  -     Vitamin B12 anemia; Future  -     Folate anemia; Future  -     TSH+Free T4; Future    9. Bilateral leg edema  -     CBC & Differential; Future  -     Comprehensive Metabolic Panel; Future  -     Lipid Panel; Future  -     Vitamin D,25-Hydroxy; Future  -     Vitamin B12 anemia; Future  -     Folate anemia; Future  -     TSH+Free T4; Future    Other orders  -     Semaglutide-Weight Management 0.25 MG/0.5ML solution auto-injector; Inject 0.5 mL under the skin into the appropriate area as directed 1 (One) Time Per Week.  Dispense: 2 mL; Refill: 5        OVERWGT--WGT LOSS DISCUSSED , from May 2022 till January 3, 2023,, has been using Saxenda with good results---will change to wegovy April 21, 2023--patient stopped the Wegovy, she had some concerns and friends told her some side effect issues, so she stopped it for the past he did well with that initially, --- patient will restart  semaglutide, as preferred on her insurance prescription sent in January 15, 2025 patient is to call monthly for updates and increasing dose    Right hip pain, sees Ortho Alex, got diclofenac recently December 2024     dyspnea/Orthopnea - echocardiogram showing some diastolic dysfunction LVH mild, normal ejection fraction, no significant 3 to 4 months, valve abnnormalities, exercise treadmill test which showed no significant findings although her rate pressure product was low she was unable to achieve a heart rate response adequate enough, so a subsequent nuclear stress test was performed May 14 , 2021,-----reported as normal,-----Chest x-ray showed no active disease April 2021     Elevated liver enzymes, most likely due to nonalcoholic steatohepatitis, numbers have improved considerably as of August 30, 2022 with weight loss and dietary changes, ---------- ultrasound of the liver shows fatty liver, no lesions, hepatitis workup including hepatitis B, C , iron profile autoimmune hepatitis on negative May 2021,--rec WGT LOSS, AND STOP DICLOFENAC --RISK OF CIRROHSIS AND LIVER DAMAGE DISCUSSED WITH PT AND  AT [facility] MAY 19 , 2021 ---off diclofenac may 2022---     HTN---cont  LISINOPRIL  20 mg qd daily at bedtime, Tenoretic 50/25 mg every morning,      VIT D LEVEL,--continues on replacement,     Right total knee arthroplasty     Primary hyperparathyroidism --status post incomplete total parathyroidectomy 2009,     CAROL, Total BSO, rectocele repair, bladder repair surgery,  Previous breast biopsy,      Follow Up   Return in about 1 year (around 1/15/2026).  Patient was given instructions and counseling regarding her condition or for health maintenance advice. Please see specific information pulled into the AVS if appropriate.           Answers submitted by the patient for this visit:  Primary Reason for Visit (Submitted on 1/15/2025)  What is the primary reason for your visit?: High Blood Pressure  High Blood  Pressure Questionnaire (Submitted on 1/15/2025)  Chief Complaint: Hypertension  Chronicity: recurrent  Onset: more than 1 year ago  Progression since onset: stable  Condition status: controlled  anxiety: No  headaches: Yes  malaise/fatigue: No  orthopnea: No  peripheral edema: No  CAD risks: dyslipidemia, family history, obesity  Compliance problems: no compliance problems

## 2025-01-15 NOTE — PROGRESS NOTES
Subjective   The ABCs of the Annual Wellness Visit  Medicare Wellness Visit      Keke Ureña is a 64 y.o. patient who presents for a Medicare Wellness Visit.    The following portions of the patient's history were reviewed and   updated as appropriate: allergies, current medications, past family history, past medical history, past social history, past surgical history, and problem list.    Compared to one year ago, the patient's physical   health is the same.  Compared to one year ago, the patient's mental   health is better.    Recent Hospitalizations:  She was not admitted to the hospital during the last year.     Current Medical Providers:  Patient Care Team:  Gab Reyes MD as PCP - General (Internal Medicine)    Outpatient Medications Prior to Visit   Medication Sig Dispense Refill    atenolol-chlorthalidone (TENORETIC) 50-25 MG per tablet TAKE ONE TABLET BY MOUTH DAILY 90 tablet 3    diclofenac (VOLTAREN) 75 MG EC tablet Take 1 tablet by mouth 2 (Two) Times a Day. 60 tablet 1    lisinopril (PRINIVIL,ZESTRIL) 40 MG tablet TAKE 1 TABLET BY MOUTH DAILY 90 tablet 3    Semaglutide-Weight Management 0.25 MG/0.5ML solution auto-injector Inject 0.25 mg under the skin into the appropriate area as directed 1 (One) Time Per Week. 2 mL 5    Semaglutide-Weight Management 0.25 MG/0.5ML solution auto-injector Inject 0.5 mL under the skin into the appropriate area as directed 1 (One) Time Per Week. 2 mL 5    Tirzepatide-Weight Management (ZEPBOUND) 5 MG/0.5ML solution auto-injector Inject 0.5 mL under the skin into the appropriate area as directed 1 (One) Time Per Week. 2 mL 5     No facility-administered medications prior to visit.     No opioid medication identified on active medication list. I have reviewed chart for other potential  high risk medication/s and harmful drug interactions in the elderly.      Aspirin is not on active medication list.  Aspirin use is not indicated based on review of  "current medical condition/s. Risk of harm outweighs potential benefits.  .    Patient Active Problem List   Diagnosis    Mixed hyperlipidemia    Hypertension, essential    Anxiety, generalized    Vitamin D deficiency    Class 2 obesity due to excess calories without serious comorbidity with body mass index (BMI) of 37.0 to 37.9 in adult    Elevated liver enzymes    Bilateral leg edema    Acute URI    Dysfunction of both eustachian tubes    COVID-19 virus infection    Screening mammogram for breast cancer    Physical exam, annual     Advance Care Planning Advance Directive is not on file.  ACP discussion was held with the patient during this visit. Patient does not have an advance directive, information provided.            Objective   Vitals:    01/15/25 0952   BP: 142/86   BP Location: Left arm   Patient Position: Sitting   Pulse: 86   Temp: 98.1 °F (36.7 °C)   SpO2: 96%   Weight: 124 kg (274 lb)   Height: 175.3 cm (69.02\")   PainSc: 0-No pain       Estimated body mass index is 40.44 kg/m² as calculated from the following:    Height as of this encounter: 175.3 cm (69.02\").    Weight as of this encounter: 124 kg (274 lb).             Gait and Balance Evaluation:  Normal    Does the patient have evidence of cognitive impairment? No                                                                                               Health  Risk Assessment    Smoking Status:  Social History     Tobacco Use   Smoking Status Never   Smokeless Tobacco Never     Alcohol Consumption:  Social History     Substance and Sexual Activity   Alcohol Use Never       Fall Risk Screen  STEADI Fall Risk Assessment was completed, and patient is at LOW risk for falls.Assessment completed on:1/15/2025    Depression Screening   Little interest or pleasure in doing things? Not at all   Feeling down, depressed, or hopeless? Not at all   PHQ-2 Total Score 0      Health Habits and Functional and Cognitive Screenin/15/2025     9:32 AM "   Functional & Cognitive Status   Do you have difficulty preparing food and eating? No    Do you have difficulty bathing yourself, getting dressed or grooming yourself? No    Do you have difficulty using the toilet? No    Do you have difficulty moving around from place to place? No    Do you have trouble with steps or getting out of a bed or a chair? No    Current Diet Well Balanced Diet    Dental Exam Up to date    Eye Exam Up to date    Exercise (times per week) 1 times per week    Current Exercises Include No Regular Exercise    Do you need help using the phone?  No    Are you deaf or do you have serious difficulty hearing?  No    Do you need help to go to places out of walking distance? No    Do you need help shopping? No    Do you need help preparing meals?  No    Do you need help with housework?  No    Do you need help with laundry? No    Do you need help taking your medications? No    Do you need help managing money? No    Do you ever drive or ride in a car without wearing a seat belt? No    Have you felt unusual stress, anger or loneliness in the last month? No    Who do you live with? Spouse    If you need help, do you have trouble finding someone available to you? No    Have you been bothered in the last four weeks by sexual problems? No    Do you have difficulty concentrating, remembering or making decisions? No        Patient-reported   Visual Acuity:  Vision Screening    Right eye Left eye Both eyes   Without correction      With correction 20/20 20/20 20/20     Age-appropriate Screening Schedule:  Refer to the list below for future screening recommendations based on patient's age, sex and/or medical conditions. Orders for these recommended tests are listed in the plan section. The patient has been provided with a written plan.    Health Maintenance List  Health Maintenance   Topic Date Due    DXA SCAN  Never done    COLORECTAL CANCER SCREENING  Never done    ZOSTER VACCINE (1 of 2) Never done    ANNUAL  WELLNESS VISIT  Never done    LIPID PANEL  04/20/2024    Pneumococcal Vaccine 65+ (2 of 2 - PPSV23 or PCV20) 11/09/2024    TDAP/TD VACCINES (2 - Td or Tdap) 12/23/2025    BMI FOLLOWUP  01/10/2026    MAMMOGRAM  06/20/2026    HEPATITIS C SCREENING  Completed    COVID-19 Vaccine  Completed    INFLUENZA VACCINE  Completed                                                                                                                                                CMS Preventative Services Quick Reference  Risk Factors Identified During Encounter  None Identified    The above risks/problems have been discussed with the patient.  Pertinent information has been shared with the patient in the After Visit Summary.  An After Visit Summary and PPPS were made available to the patient.    Follow Up:   Next Medicare Wellness visit to be scheduled in 1 year.     Assessment & Plan         Follow Up:   No follow-ups on file.        Answers submitted by the patient for this visit:  Primary Reason for Visit (Submitted on 1/15/2025)  What is the primary reason for your visit?: High Blood Pressure  High Blood Pressure Questionnaire (Submitted on 1/15/2025)  Chief Complaint: Hypertension  Chronicity: recurrent  Onset: more than 1 year ago  Progression since onset: stable  Condition status: controlled  anxiety: No  blurred vision: No  chest pain: No  headaches: Yes  malaise/fatigue: No  orthopnea: No  palpitations: No  peripheral edema: No  shortness of breath: No  CAD risks: dyslipidemia, family history, obesity  Compliance problems: no compliance problems

## 2025-01-20 DIAGNOSIS — I10 HYPERTENSION, ESSENTIAL: Primary | ICD-10-CM

## 2025-01-20 RX ORDER — ATENOLOL AND CHLORTHALIDONE TABLET 50; 25 MG/1; MG/1
1 TABLET ORAL DAILY
Qty: 90 TABLET | Refills: 3 | Status: SHIPPED | OUTPATIENT
Start: 2025-01-20

## 2025-01-20 NOTE — TELEPHONE ENCOUNTER
Caller: Keke Ureña    Relationship to patient: Self    Best call back number: [phone]     Patient is needing:   PER PATIENT PER PHARMACY THEY REQUIRE A DIAGNOSIS CODE TO RUN INSURANCE FOR SEMAGLUTIDE.    OFFICE PLEASE REACH OUT TO STACY TO CONFIRM CODE AND  IF THE PRESCRIPTION WILL HAVE TO BE SENT TO A COMPOUNDING PHARMACY.        CONTACT PATIENT WITH A STATUS UPDATE.

## 2025-01-20 NOTE — TELEPHONE ENCOUNTER
Caller: Keke Ureña    Relationship: Self    Best call back number: [phone]     Requested Prescriptions:   Requested Prescriptions     Pending Prescriptions Disp Refills    atenolol-chlorthalidone (TENORETIC) 50-25 MG per tablet 90 tablet 3     Sig: Take 1 tablet by mouth Daily.        Pharmacy where request should be sent: [facility] [id number] [facility] [place]  [address] AT [facility] [address] - [phone] [facility] [phone] FX     Last office visit with prescribing clinician: 1/15/2025   Last telemedicine visit with prescribing clinician: Visit date not found   Next office visit with prescribing clinician: 7/16/2025     Additional details provided by patient: OUT OF MEDICATION.    Does the patient have less than a 3 day supply:  [x] Yes  [] No    Would you like a call back once the refill request has been completed: [] Yes [] No    If the office needs to give you a call back, can they leave a voicemail: [] Yes [] No    Josh Conte Rep   01/20/25 13:23 EST

## 2025-02-12 ENCOUNTER — TELEPHONE (OUTPATIENT)
Dept: ORTHOPEDIC SURGERY | Facility: CLINIC | Age: 65
End: 2025-02-12
Payer: MEDICARE

## 2025-02-12 DIAGNOSIS — M25.551 RIGHT HIP PAIN: ICD-10-CM

## 2025-02-12 DIAGNOSIS — M16.11 PRIMARY OSTEOARTHRITIS OF RIGHT HIP: ICD-10-CM

## 2025-02-12 RX ORDER — DICLOFENAC SODIUM 75 MG/1
75 TABLET, DELAYED RELEASE ORAL 2 TIMES DAILY
Qty: 60 TABLET | Refills: 1 | Status: SHIPPED | OUTPATIENT
Start: 2025-02-12

## 2025-02-12 NOTE — TELEPHONE ENCOUNTER
Caller: Gretchen Ureñan Maggie    Relationship: Self    Best call back number: [phone]    Requested Prescriptions:   Requested Prescriptions     Pending Prescriptions Disp Refills    diclofenac (VOLTAREN) 75 MG EC tablet 60 tablet 1     Sig: Take 1 tablet by mouth 2 (Two) Times a Day.        Pharmacy where request should be sent: [facility] [id number]  [place], [place] - [address] AT [facility] [address] - [phone] [facility] [phone]      Last office visit with prescribing clinician: 1/10/2025   Last telemedicine visit with prescribing clinician: Visit date not found   Next office visit with prescribing clinician: 3/7/2025     Additional details provided by patient: PATIENT STATES THE MEDICATION IS WORKING.     Does the patient have less than a 3 day supply:  [x] Yes  [] No    Would you like a call back once the refill request has been completed: [x] Yes [] No    If the office needs to give you a call back, can they leave a voicemail: [x] Yes [] No    Josh Cortés Rep   02/12/25 14:32 EST

## 2025-03-07 ENCOUNTER — OFFICE VISIT (OUTPATIENT)
Dept: ORTHOPEDIC SURGERY | Facility: CLINIC | Age: 65
End: 2025-03-07
Payer: MEDICARE

## 2025-03-07 VITALS
WEIGHT: 274 LBS | DIASTOLIC BLOOD PRESSURE: 86 MMHG | SYSTOLIC BLOOD PRESSURE: 143 MMHG | HEART RATE: 62 BPM | HEIGHT: 69 IN | BODY MASS INDEX: 40.58 KG/M2 | OXYGEN SATURATION: 91 %

## 2025-03-07 DIAGNOSIS — M16.11 PRIMARY OSTEOARTHRITIS OF RIGHT HIP: Primary | ICD-10-CM

## 2025-03-07 DIAGNOSIS — E66.01 OBESITY, MORBID, BMI 40.0-49.9: ICD-10-CM

## 2025-03-07 PROCEDURE — 3079F DIAST BP 80-89 MM HG: CPT | Performed by: ORTHOPAEDIC SURGERY

## 2025-03-07 PROCEDURE — 3077F SYST BP >= 140 MM HG: CPT | Performed by: ORTHOPAEDIC SURGERY

## 2025-03-07 PROCEDURE — 99024 POSTOP FOLLOW-UP VISIT: CPT | Performed by: ORTHOPAEDIC SURGERY

## 2025-03-07 RX ORDER — DICLOFENAC SODIUM 75 MG/1
75 TABLET, DELAYED RELEASE ORAL 2 TIMES DAILY
Qty: 60 TABLET | Refills: 3 | Status: SHIPPED | OUTPATIENT
Start: 2025-03-07

## 2025-03-07 NOTE — PROGRESS NOTES
"Chief Complaint  Follow-up of the Right Hip       Subjective      Keke Ureña presents to [facility] for a follow up for her right hip. She has been treating her right hip osteoarthritis conservatively. She was last seen in the office on 01/10/25 where we prescribed Diclofenac. She states this medication gave her great relief and is not having any pain to her right hip. She denies any new injury or falls since her last visit.     No Known Allergies     Social History     Socioeconomic History    Marital status:    Tobacco Use    Smoking status: Never    Smokeless tobacco: Never   Vaping Use    Vaping status: Never Used   Substance and Sexual Activity    Alcohol use: Never    Drug use: Never    Sexual activity: Defer        I reviewed the patient's chief complaint, history of present illness, review of systems, past medical history, surgical history, family history, social history, medications, and allergy list.     Review of Systems     Constitutional: Denies fevers, chills, weight loss  Cardiovascular: Denies chest pain, shortness of breath  Skin: Denies rashes, acute skin changes  Neurologic: Denies headache, loss of consciousness  MSK: right hip pain       Vital Signs:   /86   Pulse 62   Ht 175.3 cm (69.02\")   Wt 124 kg (274 lb)   SpO2 91%   BMI 40.44 kg/m²            Ortho Exam    Physical Exam  General:Alert. No acute distress     Right lower extremity: hip flexion to 80 degrees, external rotation to 30 degrees, internal rotation to 25 degrees, negative straight leg raise, equal leg lengths, non tender to the lateral hip, distal neurovascularly intact, calf soft, positive pulses, positive EHL, FHL, GS, and TA. Sensation intact to all 5 nerves of the foot.     Procedures    Imaging Results (Most Recent)       None             Result Review :       No results found.           Assessment and Plan     Diagnoses and all orders for this visit:    1. Primary " osteoarthritis of right hip (Primary)    2. Obesity, morbid, BMI 40.0-49.9        The patient presents here today for a follow up for her right hip osteoarthritis.     She is overall doing well and denies any pain to her right hip.     She will continue the diclofenac for pain control as this has bene helping.     May consider a right hip intra articular steroid injection done by radiology.     Educated on risk of elevated BMI.  Discussed options for weight loss/decreasing BMI prior to procedure including dietician consult, weight loss options and exercise program. and Call or return if worsening symptoms.    Follow Up     PRN     Patient was given instructions and counseling regarding her condition or for health maintenance advice. Please see specific information pulled into the AVS if appropriate.     Scribed for Charles Johnson MD by Alesha Boudreaux.  03/07/25   08:44 EST    I have personally performed the services described in this document as scribed by the above individual and it is both accurate and complete. Charles Johnson MD 03/08/25

## 2025-06-04 ENCOUNTER — OFFICE VISIT (OUTPATIENT)
Dept: CARDIOLOGY | Age: 65
End: 2025-06-04
Payer: MEDICARE

## 2025-06-04 VITALS
HEIGHT: 69 IN | DIASTOLIC BLOOD PRESSURE: 88 MMHG | WEIGHT: 277 LBS | HEART RATE: 57 BPM | BODY MASS INDEX: 41.03 KG/M2 | SYSTOLIC BLOOD PRESSURE: 140 MMHG

## 2025-06-04 DIAGNOSIS — I77.810 ASCENDING AORTA DILATATION: ICD-10-CM

## 2025-06-04 DIAGNOSIS — R06.02 SHORTNESS OF BREATH: Primary | ICD-10-CM

## 2025-06-04 DIAGNOSIS — E78.2 MIXED HYPERLIPIDEMIA: ICD-10-CM

## 2025-06-04 DIAGNOSIS — I10 HYPERTENSION, ESSENTIAL: ICD-10-CM

## 2025-06-04 PROCEDURE — 1160F RVW MEDS BY RX/DR IN RCRD: CPT | Performed by: STUDENT IN AN ORGANIZED HEALTH CARE EDUCATION/TRAINING PROGRAM

## 2025-06-04 PROCEDURE — 3077F SYST BP >= 140 MM HG: CPT | Performed by: STUDENT IN AN ORGANIZED HEALTH CARE EDUCATION/TRAINING PROGRAM

## 2025-06-04 PROCEDURE — 1159F MED LIST DOCD IN RCRD: CPT | Performed by: STUDENT IN AN ORGANIZED HEALTH CARE EDUCATION/TRAINING PROGRAM

## 2025-06-04 PROCEDURE — 99204 OFFICE O/P NEW MOD 45 MIN: CPT | Performed by: STUDENT IN AN ORGANIZED HEALTH CARE EDUCATION/TRAINING PROGRAM

## 2025-06-04 PROCEDURE — 93000 ELECTROCARDIOGRAM COMPLETE: CPT | Performed by: STUDENT IN AN ORGANIZED HEALTH CARE EDUCATION/TRAINING PROGRAM

## 2025-06-04 PROCEDURE — 3079F DIAST BP 80-89 MM HG: CPT | Performed by: STUDENT IN AN ORGANIZED HEALTH CARE EDUCATION/TRAINING PROGRAM

## 2025-06-04 RX ORDER — ATORVASTATIN CALCIUM 10 MG/1
10 TABLET, FILM COATED ORAL DAILY
COMMUNITY

## 2025-06-04 NOTE — PROGRESS NOTES
"[facility]      Patient Name: Keke Ureña  :1960  Age: 64 y.o.  Encounter Provider:  Josué Finnegan MD      Chief Complaint:   Chief Complaint   Patient presents with    Shortness of Breath    Palpitations         HPI  Keke Ureña is a 64 y.o. female who presents today for evaluation of shortness of. Pt has a  history significant for hypertension, hyperlipidemia, obesity.  She reports a significant family history of both of her brothers having obstructive coronary disease.  That is her main reason for visiting today.  She has had some shortness of breath over the last few years.  Seems grossly stable but does note shortness of and walking upstairs.  Has waxing and waning lower extremity edema that seems more dependent and is worse at the end of the day and better in the morning.  She is again weight over the last couple years.  She was on Ozempic but when she switched to Medicare and was no longer covered, which caused some of the weight to come back.  She does not have any chest pain      The following portions of the patient's history were reviewed and updated as appropriate: allergies, current medications, past family history, past medical history, past social history, past surgical history and problem list.      Previous Cardiac Testing:  Echocardiogram in  showed normal EF, mild concentric hypertrophy, moderately dilated ascending aorta  She was unable to get to diagnostic heart rate on an exercise stress test in .  Subsequent nuclear stress was normal.      OBJECTIVE:   Vital Signs  Vitals:    25 0823   BP: 140/88   Pulse: 57     Estimated body mass index is 40.88 kg/m² as calculated from the following:    Height as of this encounter: 175.3 cm (69.02\").    Weight as of this encounter: 126 kg (277 lb).    Constitutional:       Appearance: Healthy appearance. Not in distress.   Neck:      Vascular: JVD normal.   Pulmonary:      Effort: Pulmonary " effort is normal.      Breath sounds: Normal breath sounds. No wheezing. No rhonchi. No rales.   Cardiovascular:      PMI at left midclavicular line. Normal rate. Regular rhythm. Normal S1. Normal S2.       Murmurs: There is no murmur.      No gallop.  No click. No rub.   Pulses:     Intact distal pulses.   Edema:     Peripheral edema absent.   Abdominal:      Palpations: Abdomen is soft.      Tenderness: There is no abdominal tenderness.   Musculoskeletal: Normal range of motion. Skin:     General: Skin is warm and dry.   Neurological:      General: No focal deficit present.      Mental Status: Alert and oriented to person, place and time.           ECG 12 Lead    Date/Time: 6/4/2025 9:15 AM  Performed by: Josué Finnegan MD    Authorized by: Josué Finnegan MD  Rhythm: sinus rhythm  Rate: normal  Conduction: non-specific intraventricular conduction delay  QRS axis: normal  Other findings: non-specific ST-T wave changes    Clinical impression: non-specific ECG               BUN   Date Value Ref Range Status   04/20/2023 23 8 - 23 mg/dL Final     Creatinine   Date Value Ref Range Status   04/20/2023 0.96 0.57 - 1.00 mg/dL Final     Potassium   Date Value Ref Range Status   04/20/2023 4.0 3.5 - 5.2 mmol/L Final     ALT (SGPT)   Date Value Ref Range Status   04/20/2023 38 (H) 1 - 33 U/L Final     AST (SGOT)   Date Value Ref Range Status   04/20/2023 27 1 - 32 U/L Final           ASSESSMENT:      Diagnosis Plan   1. Shortness of breath  Adult Transthoracic Echo Complete w/ Color, Spectral and Contrast if Necessary Per Protocol    CT Angiogram Chest    Lipid Panel    Basic Metabolic Panel    Thyroid Panel With TSH      2. Mixed hyperlipidemia  CT Angiogram Chest    Lipid Panel    Basic Metabolic Panel    Thyroid Panel With TSH      3. Hypertension, essential  CT Angiogram Chest    Lipid Panel    Basic Metabolic Panel    Thyroid Panel With TSH      4. Ascending aorta dilatation  CT Angiogram Chest    Lipid Panel     Basic Metabolic Panel    Thyroid Panel With TSH            PLAN OF CARE:     Shortness of breath  Hypertension: On lisinopril, atenolol-chlorthalidone  Hyperlipidemia: Last  in 2023.  Currently on atorvastatin 10 mg.  Dilated ascending aorta: Last assessed on echocardiogram in 2021 where it measured 4.2 cm    She will monitor her blood pressure and bring a log in for follow-up.  Will obtain echocardiogram to assess her shortness of breath.  Will also obtain CT angiogram of her chest given she had a dilated aorta on echocardiogram back in 2021.  Will assess her degree of coronary artery calcification on that study.  Can decide on ischemic assessment based on above testing.  Will obtain lipid panel, thyroid studies, BNP as well.  Return to clinic in 1 month to follow-up these results    RTC 1 month        Discharge Medications            Accurate as of June 4, 2025  9:16 AM. If you have any questions, ask your nurse or doctor.                Continue These Medications        Instructions Start Date   atenolol-chlorthalidone 50-25 MG per tablet  Commonly known as: TENORETIC   1 tablet, Oral, Daily      atorvastatin 10 MG tablet  Commonly known as: LIPITOR   10 mg, Daily      diclofenac 75 MG EC tablet  Commonly known as: VOLTAREN   75 mg, Oral, 2 Times Daily      lisinopril 40 MG tablet  Commonly known as: PRINIVIL,ZESTRIL   40 mg, Oral, Daily               Thank you for allowing me to participate in the care of your patient,      Sincerely,   Josué Finnegan MD  [facility]  06/04/25  09:16 EDT

## 2025-06-26 ENCOUNTER — HOSPITAL ENCOUNTER (OUTPATIENT)
Dept: CT IMAGING | Facility: HOSPITAL | Age: 65
Discharge: HOME | End: 2025-06-26
Admitting: STUDENT IN AN ORGANIZED HEALTH CARE EDUCATION/TRAINING PROGRAM
Payer: MEDICARE

## 2025-06-26 PROCEDURE — 25510000001 IOPAMIDOL PER 1 ML: Performed by: STUDENT IN AN ORGANIZED HEALTH CARE EDUCATION/TRAINING PROGRAM

## 2025-06-26 PROCEDURE — 71275 CT ANGIOGRAPHY CHEST: CPT

## 2025-06-26 RX ORDER — IOPAMIDOL 755 MG/ML
100 INJECTION, SOLUTION INTRAVASCULAR
Status: COMPLETED | OUTPATIENT
Start: 2025-06-26 | End: 2025-06-26

## 2025-06-26 RX ADMIN — IOPAMIDOL 100 ML: 755 INJECTION, SOLUTION INTRAVENOUS at 15:21

## 2025-06-27 ENCOUNTER — RESULTS FOLLOW-UP (OUTPATIENT)
Dept: CARDIOLOGY | Age: 65
End: 2025-06-27
Payer: MEDICARE

## 2025-06-27 NOTE — TELEPHONE ENCOUNTER
Please let her know that CTA of the chest shows mildly dilated aorta at 4.4 cm maximum diameter.  No intervention is needed unless aorta grows to greater than 5 cm.  There were some changes in the blood vessels around the liver, and spleen as well as possible hepatic steatosis.  Findings will be further discussed at her upcoming appointment with Dr. Finnegan on 7/11/25.

## 2025-07-10 ENCOUNTER — TELEPHONE (OUTPATIENT)
Dept: CARDIOLOGY | Age: 65
End: 2025-07-10
Payer: MEDICARE

## 2025-07-11 ENCOUNTER — LAB (OUTPATIENT)
Dept: LAB | Facility: HOSPITAL | Age: 65
End: 2025-07-11
Payer: MEDICARE

## 2025-07-11 ENCOUNTER — OFFICE VISIT (OUTPATIENT)
Dept: CARDIOLOGY | Age: 65
End: 2025-07-11
Payer: MEDICARE

## 2025-07-11 ENCOUNTER — HOSPITAL ENCOUNTER (OUTPATIENT)
Dept: CARDIOLOGY | Facility: HOSPITAL | Age: 65
Discharge: HOME | End: 2025-07-11
Payer: MEDICARE

## 2025-07-11 VITALS
HEART RATE: 60 BPM | HEIGHT: 69 IN | BODY MASS INDEX: 41.03 KG/M2 | WEIGHT: 277 LBS | DIASTOLIC BLOOD PRESSURE: 90 MMHG | SYSTOLIC BLOOD PRESSURE: 140 MMHG

## 2025-07-11 VITALS
DIASTOLIC BLOOD PRESSURE: 84 MMHG | WEIGHT: 277 LBS | SYSTOLIC BLOOD PRESSURE: 140 MMHG | HEART RATE: 60 BPM | BODY MASS INDEX: 41.03 KG/M2 | OXYGEN SATURATION: 95 % | HEIGHT: 69 IN

## 2025-07-11 DIAGNOSIS — Z13.1 SCREENING FOR DIABETES MELLITUS: ICD-10-CM

## 2025-07-11 DIAGNOSIS — I25.10 CORONARY ARTERY CALCIFICATION SEEN ON CAT SCAN: ICD-10-CM

## 2025-07-11 DIAGNOSIS — R06.02 SHORTNESS OF BREATH: ICD-10-CM

## 2025-07-11 DIAGNOSIS — I10 HYPERTENSION, ESSENTIAL: ICD-10-CM

## 2025-07-11 DIAGNOSIS — R53.83 FATIGUE, UNSPECIFIED TYPE: ICD-10-CM

## 2025-07-11 DIAGNOSIS — E78.2 MIXED HYPERLIPIDEMIA: ICD-10-CM

## 2025-07-11 DIAGNOSIS — I77.810 ASCENDING AORTA DILATATION: ICD-10-CM

## 2025-07-11 DIAGNOSIS — I25.10 CORONARY ARTERY CALCIFICATION SEEN ON CAT SCAN: Primary | ICD-10-CM

## 2025-07-11 LAB
ALBUMIN SERPL-MCNC: 3.9 G/DL (ref 3.5–5.2)
ALBUMIN/GLOB SERPL: 1 G/DL
ALP SERPL-CCNC: 81 U/L (ref 39–117)
ALT SERPL W P-5'-P-CCNC: 139 U/L (ref 1–33)
ANION GAP SERPL CALCULATED.3IONS-SCNC: 8 MMOL/L (ref 5–15)
AORTIC ARCH: 3.2 CM
AORTIC DIMENSIONLESS INDEX: 0.58 (DI)
ASCENDING AORTA: 4.2 CM
AST SERPL-CCNC: 119 U/L (ref 1–32)
AV MEAN PRESS GRAD SYS DOP V1V2: 3.6 MMHG
AV VMAX SYS DOP: 118.1 CM/SEC
BASOPHILS # BLD AUTO: 0.05 10*3/MM3 (ref 0–0.2)
BASOPHILS NFR BLD AUTO: 0.7 % (ref 0–1.5)
BH CV ECHO LEFT VENTRICLE GLOBAL LONGITUDINAL STRAIN: -22.8 %
BH CV ECHO MEAS - ACS: 2.31 CM
BH CV ECHO MEAS - AO MAX PG: 5.6 MMHG
BH CV ECHO MEAS - AO ROOT AREA (BSA CORRECTED): 1.7 CM2
BH CV ECHO MEAS - AO ROOT DIAM: 4.1 CM
BH CV ECHO MEAS - AO V2 VTI: 29.2 CM
BH CV ECHO MEAS - AVA(I,D): 2.7 CM2
BH CV ECHO MEAS - EDV(CUBED): 125 ML
BH CV ECHO MEAS - EDV(MOD-SP2): 120 ML
BH CV ECHO MEAS - EDV(MOD-SP4): 116 ML
BH CV ECHO MEAS - EF(MOD-SP2): 66.7 %
BH CV ECHO MEAS - EF(MOD-SP4): 67.2 %
BH CV ECHO MEAS - ESV(CUBED): 25.7 ML
BH CV ECHO MEAS - ESV(MOD-SP2): 40 ML
BH CV ECHO MEAS - ESV(MOD-SP4): 38 ML
BH CV ECHO MEAS - FS: 41 %
BH CV ECHO MEAS - IVS/LVPW: 1 CM
BH CV ECHO MEAS - IVSD: 1.1 CM
BH CV ECHO MEAS - LAT PEAK E' VEL: 4.7 CM/SEC
BH CV ECHO MEAS - LV DIASTOLIC VOL/BSA (35-75): 48.9 CM2
BH CV ECHO MEAS - LV MASS(C)D: 207.1 GRAMS
BH CV ECHO MEAS - LV MAX PG: 1.93 MMHG
BH CV ECHO MEAS - LV MEAN PG: 1.41 MMHG
BH CV ECHO MEAS - LV SYSTOLIC VOL/BSA (12-30): 16 CM2
BH CV ECHO MEAS - LV V1 MAX: 69.5 CM/SEC
BH CV ECHO MEAS - LV V1 VTI: 17 CM
BH CV ECHO MEAS - LVIDD: 5 CM
BH CV ECHO MEAS - LVIDS: 3 CM
BH CV ECHO MEAS - LVOT AREA: 4.7 CM2
BH CV ECHO MEAS - LVOT DIAM: 2.44 CM
BH CV ECHO MEAS - LVPWD: 1.1 CM
BH CV ECHO MEAS - MED PEAK E' VEL: 3.6 CM/SEC
BH CV ECHO MEAS - MV A DUR: 0.15 SEC
BH CV ECHO MEAS - MV A MAX VEL: 53.7 CM/SEC
BH CV ECHO MEAS - MV DEC SLOPE: 174.4 CM/SEC2
BH CV ECHO MEAS - MV DEC TIME: 0.25 SEC
BH CV ECHO MEAS - MV E MAX VEL: 43.9 CM/SEC
BH CV ECHO MEAS - MV E/A: 0.82
BH CV ECHO MEAS - MV MAX PG: 2.15 MMHG
BH CV ECHO MEAS - MV MEAN PG: 0.7 MMHG
BH CV ECHO MEAS - MV P1/2T: 90 MSEC
BH CV ECHO MEAS - MV V2 VTI: 23.7 CM
BH CV ECHO MEAS - MVA(P1/2T): 2.44 CM2
BH CV ECHO MEAS - MVA(VTI): 3.4 CM2
BH CV ECHO MEAS - PA ACC TIME: 0.11 SEC
BH CV ECHO MEAS - PA V2 MAX: 86.9 CM/SEC
BH CV ECHO MEAS - PULM A REVS DUR: 0.13 SEC
BH CV ECHO MEAS - PULM A REVS VEL: 27.1 CM/SEC
BH CV ECHO MEAS - PULM DIAS VEL: 30.5 CM/SEC
BH CV ECHO MEAS - PULM S/D: 1
BH CV ECHO MEAS - PULM SYS VEL: 30.5 CM/SEC
BH CV ECHO MEAS - QP/QS: 0.83
BH CV ECHO MEAS - RAP SYSTOLE: 15 MMHG
BH CV ECHO MEAS - RV MAX PG: 1.46 MMHG
BH CV ECHO MEAS - RV V1 MAX: 60.4 CM/SEC
BH CV ECHO MEAS - RV V1 VTI: 14.7 CM
BH CV ECHO MEAS - RVOT DIAM: 2.39 CM
BH CV ECHO MEAS - RVSP: 30.9 MMHG
BH CV ECHO MEAS - SUP REN AO DIAM: 2 CM
BH CV ECHO MEAS - SV(LVOT): 79.8 ML
BH CV ECHO MEAS - SV(MOD-SP2): 80 ML
BH CV ECHO MEAS - SV(MOD-SP4): 78 ML
BH CV ECHO MEAS - SV(RVOT): 66 ML
BH CV ECHO MEAS - SVI(LVOT): 33.6 ML/M2
BH CV ECHO MEAS - SVI(MOD-SP2): 33.7 ML/M2
BH CV ECHO MEAS - SVI(MOD-SP4): 32.9 ML/M2
BH CV ECHO MEAS - TAPSE (>1.6): 1.78 CM
BH CV ECHO MEAS - TR MAX PG: 15.9 MMHG
BH CV ECHO MEAS - TR MAX VEL: 199.5 CM/SEC
BH CV ECHO MEASUREMENTS AVERAGE E/E' RATIO: 10.58
BH CV XLRA - RV BASE: 3.5 CM
BH CV XLRA - RV LENGTH: 8.1 CM
BH CV XLRA - RV MID: 2.6 CM
BH CV XLRA - TDI S': 9.8 CM/SEC
BILIRUB SERPL-MCNC: 0.6 MG/DL (ref 0–1.2)
BUN SERPL-MCNC: 23 MG/DL (ref 8–23)
BUN/CREAT SERPL: 27.7 (ref 7–25)
CALCIUM SPEC-SCNC: 8.8 MG/DL (ref 8.6–10.5)
CHLORIDE SERPL-SCNC: 102 MMOL/L (ref 98–107)
CHOLEST SERPL-MCNC: 134 MG/DL (ref 0–200)
CO2 SERPL-SCNC: 30 MMOL/L (ref 22–29)
CREAT SERPL-MCNC: 0.83 MG/DL (ref 0.57–1)
DEPRECATED RDW RBC AUTO: 44.3 FL (ref 37–54)
EGFRCR SERPLBLD CKD-EPI 2021: 78.3 ML/MIN/1.73
EOSINOPHIL # BLD AUTO: 0.28 10*3/MM3 (ref 0–0.4)
EOSINOPHIL NFR BLD AUTO: 3.9 % (ref 0.3–6.2)
ERYTHROCYTE [DISTWIDTH] IN BLOOD BY AUTOMATED COUNT: 12.4 % (ref 12.3–15.4)
GLOBULIN UR ELPH-MCNC: 4 GM/DL
GLUCOSE SERPL-MCNC: 83 MG/DL (ref 65–99)
HBA1C MFR BLD: 5.6 % (ref 4.8–5.6)
HCT VFR BLD AUTO: 42.1 % (ref 34–46.6)
HDLC SERPL-MCNC: 32 MG/DL (ref 40–60)
HGB BLD-MCNC: 14.1 G/DL (ref 12–15.9)
IMM GRANULOCYTES # BLD AUTO: 0.03 10*3/MM3 (ref 0–0.05)
IMM GRANULOCYTES NFR BLD AUTO: 0.4 % (ref 0–0.5)
LDLC SERPL CALC-MCNC: 75 MG/DL (ref 0–100)
LDLC/HDLC SERPL: 2.21 {RATIO}
LEFT ATRIUM VOLUME INDEX: 25.6 ML/M2
LV EF 3D SEGMENTATION: 68 %
LV EF BIPLANE MOD: 66.7 %
LYMPHOCYTES # BLD AUTO: 3.26 10*3/MM3 (ref 0.7–3.1)
LYMPHOCYTES NFR BLD AUTO: 44.8 % (ref 19.6–45.3)
MCH RBC QN AUTO: 32.4 PG (ref 26.6–33)
MCHC RBC AUTO-ENTMCNC: 33.5 G/DL (ref 31.5–35.7)
MCV RBC AUTO: 96.8 FL (ref 79–97)
MONOCYTES # BLD AUTO: 0.6 10*3/MM3 (ref 0.1–0.9)
MONOCYTES NFR BLD AUTO: 8.3 % (ref 5–12)
NEUTROPHILS NFR BLD AUTO: 3.05 10*3/MM3 (ref 1.7–7)
NEUTROPHILS NFR BLD AUTO: 41.9 % (ref 42.7–76)
NRBC BLD AUTO-RTO: 0 /100 WBC (ref 0–0.2)
PLATELET # BLD AUTO: 218 10*3/MM3 (ref 140–450)
PMV BLD AUTO: 9.3 FL (ref 6–12)
POTASSIUM SERPL-SCNC: 4.3 MMOL/L (ref 3.5–5.2)
PROT SERPL-MCNC: 7.9 G/DL (ref 6–8.5)
RBC # BLD AUTO: 4.35 10*6/MM3 (ref 3.77–5.28)
SINUS: 3.6 CM
SODIUM SERPL-SCNC: 140 MMOL/L (ref 136–145)
STJ: 3.3 CM
T-UPTAKE NFR SERPL: 1.14 TBI (ref 0.8–1.3)
T4 SERPL-MCNC: 9.03 MCG/DL (ref 4.5–11.7)
TRIGL SERPL-MCNC: 156 MG/DL (ref 0–150)
TSH SERPL DL<=0.05 MIU/L-ACNC: 2.43 UIU/ML (ref 0.27–4.2)
VLDLC SERPL-MCNC: 27 MG/DL (ref 5–40)
WBC NRBC COR # BLD AUTO: 7.27 10*3/MM3 (ref 3.4–10.8)

## 2025-07-11 PROCEDURE — 80053 COMPREHEN METABOLIC PANEL: CPT

## 2025-07-11 PROCEDURE — 84443 ASSAY THYROID STIM HORMONE: CPT

## 2025-07-11 PROCEDURE — 36415 COLL VENOUS BLD VENIPUNCTURE: CPT

## 2025-07-11 PROCEDURE — 3080F DIAST BP >= 90 MM HG: CPT | Performed by: STUDENT IN AN ORGANIZED HEALTH CARE EDUCATION/TRAINING PROGRAM

## 2025-07-11 PROCEDURE — 93306 TTE W/DOPPLER COMPLETE: CPT

## 2025-07-11 PROCEDURE — 83036 HEMOGLOBIN GLYCOSYLATED A1C: CPT

## 2025-07-11 PROCEDURE — 99214 OFFICE O/P EST MOD 30 MIN: CPT | Performed by: STUDENT IN AN ORGANIZED HEALTH CARE EDUCATION/TRAINING PROGRAM

## 2025-07-11 PROCEDURE — 84479 ASSAY OF THYROID (T3 OR T4): CPT

## 2025-07-11 PROCEDURE — 85025 COMPLETE CBC W/AUTO DIFF WBC: CPT

## 2025-07-11 PROCEDURE — 3077F SYST BP >= 140 MM HG: CPT | Performed by: STUDENT IN AN ORGANIZED HEALTH CARE EDUCATION/TRAINING PROGRAM

## 2025-07-11 PROCEDURE — 93356 MYOCRD STRAIN IMG SPCKL TRCK: CPT

## 2025-07-11 PROCEDURE — 84436 ASSAY OF TOTAL THYROXINE: CPT

## 2025-07-11 PROCEDURE — 80061 LIPID PANEL: CPT

## 2025-07-11 NOTE — PROGRESS NOTES
[facility]      Patient Name: Keke Ureña  :1960  Age: 64 y.o.  Encounter Provider:  Josué Finnegan MD      Chief Complaint:   Chief Complaint   Patient presents with    Hypertension    dilated ascending aorta         Hypertension    Keke Ureña is a 64 y.o. female who presents today for follow-up fJuan Pt has a  history significant for dilated aorta, hypertension, hyperlipidemia, obesity.  She reports a significant family history of both of her brothers having obstructive coronary disease.      She has had shortness of breath that is worsened over the last year, corresponding to weight gain.  Does not have any chest pain.  Gets short of breath walking across parking lot.  Also has dependent edema worse within the day, better in the morning.    Echocardiogram was notable for grossly normal biventricular function with some mild diastolic dysfunction and mild mitral regurgitation.  Aortic valve intact, aortic root was dilated    Her symptoms are relatively unchanged from prior.  Again denies chest pain    The following portions of the patient's history were reviewed and updated as appropriate: allergies, current medications, past family history, past medical history, past social history, past surgical history and problem list.      Previous Cardiac Testing:  Echocardiogram in  showed normal EF, mild concentric hypertrophy, moderately dilated ascending aorta  She was unable to get to diagnostic heart rate on an exercise stress test in .  Subsequent nuclear stress was normal.    Echocardiogram :Echocardiogram was notable for grossly normal biventricular function with some mild diastolic dysfunction and mild mitral regurgitation.  Aortic valve intact, aortic root was dilated    OBJECTIVE:   Vital Signs  Vitals:    25 1026   BP: 140/90   Pulse: 60     Estimated body mass index is 40.91 kg/m² as calculated from the following:    Height as of this  "encounter: 175.3 cm (69\").    Weight as of this encounter: 126 kg (277 lb).    Constitutional:       Appearance: Healthy appearance. Not in distress.   Neck:      Vascular: JVD normal.   Pulmonary:      Effort: Pulmonary effort is normal.      Breath sounds: Normal breath sounds. No wheezing. No rhonchi. No rales.   Cardiovascular:      PMI at left midclavicular line. Normal rate. Regular rhythm. Normal S1. Normal S2.       Murmurs: There is no murmur.      No gallop.  No click. No rub.   Pulses:     Intact distal pulses.   Edema:     Peripheral edema absent.   Abdominal:      Palpations: Abdomen is soft.      Tenderness: There is no abdominal tenderness.   Musculoskeletal: Normal range of motion. Skin:     General: Skin is warm and dry.   Neurological:      General: No focal deficit present.      Mental Status: Alert and oriented to person, place and time.         Procedures         BUN   Date Value Ref Range Status   04/20/2023 23 8 - 23 mg/dL Final     Creatinine   Date Value Ref Range Status   04/20/2023 0.96 0.57 - 1.00 mg/dL Final     Potassium   Date Value Ref Range Status   04/20/2023 4.0 3.5 - 5.2 mmol/L Final     ALT (SGPT)   Date Value Ref Range Status   04/20/2023 38 (H) 1 - 33 U/L Final     AST (SGOT)   Date Value Ref Range Status   04/20/2023 27 1 - 32 U/L Final           ASSESSMENT:      Diagnosis Plan   1. Coronary artery calcification seen on CAT scan  Stress Test With PET Myocardial Perfusion    Lipid Panel    Thyroid Panel With TSH    Hemoglobin A1c    Comprehensive Metabolic Panel    CBC & Differential      2. Shortness of breath  Lipid Panel    Thyroid Panel With TSH    Hemoglobin A1c    Comprehensive Metabolic Panel    CBC & Differential      3. Screening for diabetes mellitus  Lipid Panel    Thyroid Panel With TSH    Hemoglobin A1c    Comprehensive Metabolic Panel    CBC & Differential      4. Fatigue, unspecified type  Lipid Panel    Thyroid Panel With TSH    Hemoglobin A1c    Comprehensive " Metabolic Panel    CBC & Differential      5. Mixed hyperlipidemia        6. Hypertension, essential        7. Ascending aorta dilatation                PLAN OF CARE:     Shortness of breath: Echocardiogram with mild mitral regurgitation but no reason for her shortness of breath.  She does have some mild coronary calcifications and I think we need to assess for obstructive coronary disease.  Stress PET ordered (due to immobility and weight).  If this is normal I think a lot of her symptoms are related to weight gain over the last year or so  Coronary artery calcifications on CT: Personally reviewed, mild.  Continue risk factor modification.  Discussed aspirin 81 mg but she will hold off at this time  Hypertension: On lisinopril, atenolol-chlorthalidone.  Her blood pressure is mildly elevated today. She will keep a log and let me know home reads.   Hyperlipidemia: Last  in 2023.  Currently on atorvastatin 10 mg.  Lipid panel ordered  Dilated aortic root/ascending aorta: Aortic root measures 4.4 cm, ascending aorta measures 4 cm.  This is better visualized on CT compared to today's echocardiogram.  Will repeat next year.    Mild mitral regurgitation  Obesity: long discussion regarding dietary changes.       Return to clinic in 6 months, sooner if needed based on above pending studies       Discharge Medications            Accurate as of July 11, 2025 11:41 AM. If you have any questions, ask your nurse or doctor.                Continue These Medications        Instructions Start Date   atenolol-chlorthalidone 50-25 MG per tablet  Commonly known as: TENORETIC   1 tablet, Oral, Daily      atorvastatin 10 MG tablet  Commonly known as: LIPITOR   10 mg, Daily      diclofenac 75 MG EC tablet  Commonly known as: VOLTAREN   75 mg, Oral, 2 Times Daily      lisinopril 40 MG tablet  Commonly known as: PRINIVIL,ZESTRIL   40 mg, Oral, Daily      MAGNESIUM PO   Take  by mouth.      MULTI COMPLETE PO   Take  by mouth.                Thank you for allowing me to participate in the care of your patient,      Sincerely,   Josué Finnegan MD  [facility]  07/11/25  11:41 EDT

## 2025-07-16 ENCOUNTER — OFFICE VISIT (OUTPATIENT)
Dept: INTERNAL MEDICINE | Age: 65
End: 2025-07-16
Payer: MEDICARE

## 2025-07-16 ENCOUNTER — TELEPHONE (OUTPATIENT)
Dept: CARDIOLOGY | Age: 65
End: 2025-07-16
Payer: MEDICARE

## 2025-07-16 VITALS
HEART RATE: 91 BPM | OXYGEN SATURATION: 93 % | BODY MASS INDEX: 40.7 KG/M2 | HEIGHT: 69 IN | DIASTOLIC BLOOD PRESSURE: 90 MMHG | TEMPERATURE: 98.2 F | SYSTOLIC BLOOD PRESSURE: 138 MMHG | WEIGHT: 274.8 LBS

## 2025-07-16 DIAGNOSIS — F41.1 ANXIETY, GENERALIZED: ICD-10-CM

## 2025-07-16 DIAGNOSIS — E66.812 CLASS 2 OBESITY DUE TO EXCESS CALORIES WITHOUT SERIOUS COMORBIDITY WITH BODY MASS INDEX (BMI) OF 37.0 TO 37.9 IN ADULT: ICD-10-CM

## 2025-07-16 DIAGNOSIS — Z12.31 SCREENING MAMMOGRAM FOR BREAST CANCER: ICD-10-CM

## 2025-07-16 DIAGNOSIS — E78.2 MIXED HYPERLIPIDEMIA: ICD-10-CM

## 2025-07-16 DIAGNOSIS — E66.09 CLASS 2 OBESITY DUE TO EXCESS CALORIES WITHOUT SERIOUS COMORBIDITY WITH BODY MASS INDEX (BMI) OF 37.0 TO 37.9 IN ADULT: ICD-10-CM

## 2025-07-16 DIAGNOSIS — I10 HYPERTENSION, ESSENTIAL: ICD-10-CM

## 2025-07-16 DIAGNOSIS — R60.0 BILATERAL LEG EDEMA: ICD-10-CM

## 2025-07-16 DIAGNOSIS — R74.8 ELEVATED LIVER ENZYMES: Primary | ICD-10-CM

## 2025-07-16 NOTE — PROGRESS NOTES
"Chief Complaint   Patient presents with    Hypertension     Routine follow up, lab follow up - would like to discuss further CBC, CMP, lipid labs as she saw on my chart they were abnormal.    Hyperlipidemia    Anxiety        Objective   Vital Signs  Vitals:    07/16/25 0940   BP: 138/90   BP Location: Left arm   Patient Position: Sitting   Pulse: 91   Temp: 98.2 °F (36.8 °C)   SpO2: 93%   Weight: 125 kg (274 lb 12.8 oz)   Height: 175.3 cm (69.02\")      Body mass index is 40.56 kg/m².  Review of Systems   Constitutional: Negative.    HENT: Negative.     Eyes: Negative.    Respiratory: Negative.     Cardiovascular: Negative.    Gastrointestinal: Negative.    Endocrine: Negative.    Genitourinary: Negative.    Musculoskeletal: Negative.    Allergic/Immunologic: Negative.    Neurological: Negative.    Hematological: Negative.    Psychiatric/Behavioral: Negative.        Physical Exam  Constitutional:       General: She is not in acute distress.     Appearance: Normal appearance. She is obese.   HENT:      Head: Normocephalic.      Mouth/Throat:      Mouth: Mucous membranes are moist.   Eyes:      Conjunctiva/sclera: Conjunctivae normal.      Pupils: Pupils are equal, round, and reactive to light.   Cardiovascular:      Rate and Rhythm: Normal rate and regular rhythm.      Pulses: Normal pulses.      Heart sounds: Normal heart sounds.   Pulmonary:      Effort: Pulmonary effort is normal.      Breath sounds: Normal breath sounds.   Abdominal:      General: Bowel sounds are normal.      Palpations: Abdomen is soft.   Musculoskeletal:         General: No swelling. Normal range of motion.      Cervical back: Neck supple.   Skin:     General: Skin is warm and dry.      Coloration: Skin is not jaundiced.   Neurological:      General: No focal deficit present.      Mental Status: She is alert and oriented to person, place, and time. Mental status is at baseline.   Psychiatric:         Mood and Affect: Mood normal.         " Behavior: Behavior normal.         Thought Content: Thought content normal.         Judgment: Judgment normal.        Result Review :   Lab Results   Component Value Date     03/22/2021     CMP          7/11/2025    11:59   CMP   Glucose 83    BUN 23.0    Creatinine 0.83    EGFR 78.3    Sodium 140    Potassium 4.3    Chloride 102    Calcium 8.8    Total Protein 7.9    Albumin 3.9    Globulin 4.0    Total Bilirubin 0.6    Alkaline Phosphatase 81    AST (SGOT) 119    ALT (SGPT) 139    Albumin/Globulin Ratio 1.0    BUN/Creatinine Ratio 27.7    Anion Gap 8.0      CBC w/diff          7/11/2025    11:59   CBC w/Diff   WBC 7.27    RBC 4.35    Hemoglobin 14.1    Hematocrit 42.1    MCV 96.8    MCH 32.4    MCHC 33.5    RDW 12.4    Platelets 218    Neutrophil Rel % 41.9    Immature Granulocyte Rel % 0.4    Lymphocyte Rel % 44.8    Monocyte Rel % 8.3    Eosinophil Rel % 3.9    Basophil Rel % 0.7       Lipid Panel          7/11/2025    11:59   Lipid Panel   Total Cholesterol 134    Triglycerides 156    HDL Cholesterol 32    VLDL Cholesterol 27    LDL Cholesterol  75    LDL/HDL Ratio 2.21       Lab Results   Component Value Date    TSH 2.430 07/11/2025    TSH 2.190 03/22/2021    TSH 2.070 06/23/2020      Lab Results   Component Value Date    FREET4 1.4 03/22/2021    FREET4 1.4 06/23/2020    FREET4 1.4 12/19/2019      A1C Last 3 Results          7/11/2025    11:59   HGBA1C Last 3 Results   Hemoglobin A1C 5.60                        Visit Diagnoses:    ICD-10-CM ICD-9-CM   1. Elevated liver enzymes  R74.8 790.5   2. Anxiety, generalized  F41.1 300.02   3. Screening mammogram for breast cancer  Z12.31 V76.12   4. Mixed hyperlipidemia  E78.2 272.2   5. Hypertension, essential  I10 401.9   6. Class 2 obesity due to excess calories without serious comorbidity with body mass index (BMI) of 37.0 to 37.9 in adult  E66.812 278.00    E66.09 V85.37    Z68.37    7. Bilateral leg edema  R60.0 782.3       Assessment and Plan   Diagnoses  and all orders for this visit:    1. Elevated liver enzymes (Primary)  -     Comprehensive Metabolic Panel; Future  -     US Liver; Future    2. Anxiety, generalized  -     Comprehensive Metabolic Panel; Future  -     US Liver; Future    3. Screening mammogram for breast cancer  -     Comprehensive Metabolic Panel; Future  -     US Liver; Future    4. Mixed hyperlipidemia  -     Comprehensive Metabolic Panel; Future  -     US Liver; Future    5. Hypertension, essential  -     Comprehensive Metabolic Panel; Future  -     US Liver; Future    6. Class 2 obesity due to excess calories without serious comorbidity with body mass index (BMI) of 37.0 to 37.9 in adult  -     Comprehensive Metabolic Panel; Future  -     US Liver; Future    7. Bilateral leg edema  -     Comprehensive Metabolic Panel; Future  -     US Liver; Future        OVERWGT--WGT LOSS DISCUSSED , from May 2022 till January 3, 2023,, has been using Saxenda with good results---will change to wegovy April 21, 2023--patient stopped the Wegovy, she had some concerns and friends told her some side effect issues, so she stopped it for the past he did well with that initially, --- patient will restart semaglutide, as preferred on her insurance prescription sent in January 15, 2025 patient is to call monthly for updates and increasing dose---NOT TAKING GLP1 NOW JULY 2025 ===SAMPLE OF WEGOVY GIVEN     Mixed hyperlipidemia continues atorvastatin 10 MG QD ===WILL STOP FOR now, July 2025 due to elevating liver enzymes,    hemoglobin A1c 5.6 July 11, 2025    Right hip pain, sees [facility], got diclofenac December 2024     dyspnea/Orthopnea - echocardiogram showing some diastolic dysfunction LVH mild, normal ejection fraction, no significant 3 to 4 months, valve abnnormalities, exercise treadmill test which showed no significant findings although her rate pressure product was low she was unable to achieve a heart rate response adequate enough, so a subsequent  nuclear stress test was performed May 14 , 2021,-----reported as normal,-----Chest x-ray showed no active disease April 2021     Elevated liver enzymes, most likely due to nonalcoholic steatohepatitis, numbers have improved considerably as of August 30, 2022 with weight loss and dietary changes, ---------- ultrasound of the liver shows fatty liver, no lesions, hepatitis workup including hepatitis B, C , iron profile autoimmune hepatitis on negative May 2021,--rec WGT LOSS, AND STOP DICLOFENAC --RISK OF CIRROHSIS AND LIVER DAMAGE DISCUSSED WITH PT AND  AT [facility] MAY 19 , 2021 ---off diclofenac may 2022--- because of starting of statins and diclofenac usage, encouraged her to stop both if possible, recheck labs in 3 months with an ultrasound of the liver October 2025,     HTN---cont  LISINOPRIL  20 mg qd daily at bedtime, Tenoretic 50/25 mg every morning,      VIT D LEVEL,--continues on replacement,     Right total knee arthroplasty     Primary hyperparathyroidism --status post incomplete total parathyroidectomy 2009,     CAROL, Total BSO, rectocele repair, bladder repair surgery,  Previous breast biopsy,                 Follow Up   Return in about 3 months (around 10/16/2025).  Patient was given instructions and counseling regarding her condition or for health maintenance advice. Please see specific information pulled into the AVS if appropriate.           Answers submitted by the patient for this visit:  Chronic Condition Follow-up (Submitted on 7/14/2025)  Chief Complaint: PCP follow-up  other: Yes  Medication compliance: all of the time  Treatment barriers: poor diet  Exercise: rarely

## 2025-07-17 ENCOUNTER — RESULTS FOLLOW-UP (OUTPATIENT)
Dept: CARDIOLOGY | Age: 65
End: 2025-07-17
Payer: MEDICARE

## 2025-07-17 ENCOUNTER — HOSPITAL ENCOUNTER (OUTPATIENT)
Dept: CARDIOLOGY | Facility: HOSPITAL | Age: 65
Discharge: HOME | End: 2025-07-17
Payer: MEDICARE

## 2025-07-17 VITALS — BODY MASS INDEX: 40.82 KG/M2 | HEIGHT: 69 IN | WEIGHT: 275.57 LBS

## 2025-07-17 DIAGNOSIS — I25.10 CORONARY ARTERY CALCIFICATION SEEN ON CAT SCAN: ICD-10-CM

## 2025-07-17 LAB
BH CV NUCLEAR PRIOR STUDY: 2
BH CV REST NUCLEAR ISOTOPE DOSE: 35.3 MCI
BH CV STRESS BP STAGE 1: NORMAL
BH CV STRESS COMMENTS STAGE 1: NORMAL
BH CV STRESS DOSE REGADENOSON STAGE 1: 0.4
BH CV STRESS DURATION MIN STAGE 1: 0
BH CV STRESS DURATION SEC STAGE 1: 10
BH CV STRESS HR STAGE 1: 78
BH CV STRESS NUCLEAR ISOTOPE DOSE: 35.3 MCI
BH CV STRESS O2 STAGE 1: 98
BH CV STRESS PROTOCOL 1: NORMAL
BH CV STRESS RECOVERY BP: NORMAL MMHG
BH CV STRESS RECOVERY HR: 69 BPM
BH CV STRESS RECOVERY O2: 98 %
BH CV STRESS STAGE 1: 1
MAXIMAL PREDICTED HEART RATE: 155 BPM
PERCENT MAX PREDICTED HR: 50.32 %
SPECT HRT GATED+EF W RNC IV: 58 %
STRESS BASELINE BP: NORMAL MMHG
STRESS BASELINE HR: 59 BPM
STRESS O2 SAT REST: 96 %
STRESS PERCENT HR: 59 %
STRESS POST EXERCISE DUR MIN: 0 MIN
STRESS POST EXERCISE DUR SEC: 10 SEC
STRESS POST O2 SAT PEAK: 98 %
STRESS POST PEAK BP: NORMAL MMHG
STRESS POST PEAK HR: 78 BPM
STRESS TARGET HR: 132 BPM

## 2025-07-17 PROCEDURE — 25010000002 REGADENOSON 0.4 MG/5ML SOLUTION: Performed by: STUDENT IN AN ORGANIZED HEALTH CARE EDUCATION/TRAINING PROGRAM

## 2025-07-17 PROCEDURE — 34310000005 RUBIDIUM CHLORIDE: Performed by: STUDENT IN AN ORGANIZED HEALTH CARE EDUCATION/TRAINING PROGRAM

## 2025-07-17 PROCEDURE — 78492 MYOCRD IMG PET MLT RST&STRS: CPT

## 2025-07-17 PROCEDURE — A9555 RB82 RUBIDIUM: HCPCS | Performed by: STUDENT IN AN ORGANIZED HEALTH CARE EDUCATION/TRAINING PROGRAM

## 2025-07-17 PROCEDURE — 93017 CV STRESS TEST TRACING ONLY: CPT

## 2025-07-17 RX ORDER — REGADENOSON 0.08 MG/ML
0.4 INJECTION, SOLUTION INTRAVENOUS
Status: COMPLETED | OUTPATIENT
Start: 2025-07-17 | End: 2025-07-17

## 2025-07-17 RX ADMIN — REGADENOSON 0.4 MG: 0.08 INJECTION, SOLUTION INTRAVENOUS at 11:28

## 2025-07-17 NOTE — TELEPHONE ENCOUNTER
Results called to pt.  Instructed to call with any further questions or concerns.  Verbalized understanding.    Jacey Juan RN  Triage Nurse, [facility] [place]  07/17/25 14:34 EDT

## 2025-08-16 ENCOUNTER — HOSPITAL ENCOUNTER (OUTPATIENT)
Dept: ULTRASOUND IMAGING | Facility: HOSPITAL | Age: 65
Discharge: HOME | End: 2025-08-16
Payer: MEDICARE

## 2025-08-16 DIAGNOSIS — I10 HYPERTENSION, ESSENTIAL: ICD-10-CM

## 2025-08-16 DIAGNOSIS — R60.0 BILATERAL LEG EDEMA: ICD-10-CM

## 2025-08-16 DIAGNOSIS — E78.2 MIXED HYPERLIPIDEMIA: ICD-10-CM

## 2025-08-16 DIAGNOSIS — E66.09 CLASS 2 OBESITY DUE TO EXCESS CALORIES WITHOUT SERIOUS COMORBIDITY WITH BODY MASS INDEX (BMI) OF 37.0 TO 37.9 IN ADULT: ICD-10-CM

## 2025-08-16 DIAGNOSIS — E66.812 CLASS 2 OBESITY DUE TO EXCESS CALORIES WITHOUT SERIOUS COMORBIDITY WITH BODY MASS INDEX (BMI) OF 37.0 TO 37.9 IN ADULT: ICD-10-CM

## 2025-08-16 DIAGNOSIS — Z12.31 SCREENING MAMMOGRAM FOR BREAST CANCER: ICD-10-CM

## 2025-08-16 DIAGNOSIS — R74.8 ELEVATED LIVER ENZYMES: ICD-10-CM

## 2025-08-16 DIAGNOSIS — F41.1 ANXIETY, GENERALIZED: ICD-10-CM

## 2025-08-16 PROCEDURE — 76705 ECHO EXAM OF ABDOMEN: CPT
